# Patient Record
Sex: FEMALE | Race: WHITE | NOT HISPANIC OR LATINO | Employment: OTHER | URBAN - METROPOLITAN AREA
[De-identification: names, ages, dates, MRNs, and addresses within clinical notes are randomized per-mention and may not be internally consistent; named-entity substitution may affect disease eponyms.]

---

## 2017-02-08 DIAGNOSIS — D72.819 DECREASED WHITE BLOOD CELL COUNT: ICD-10-CM

## 2017-02-08 DIAGNOSIS — E78.5 HYPERLIPIDEMIA: ICD-10-CM

## 2017-02-08 DIAGNOSIS — R94.5 ABNORMAL RESULTS OF LIVER FUNCTION STUDIES: ICD-10-CM

## 2017-04-05 ENCOUNTER — GENERIC CONVERSION - ENCOUNTER (OUTPATIENT)
Dept: OTHER | Facility: OTHER | Age: 57
End: 2017-04-05

## 2017-04-07 ENCOUNTER — GENERIC CONVERSION - ENCOUNTER (OUTPATIENT)
Dept: OTHER | Facility: OTHER | Age: 57
End: 2017-04-07

## 2017-04-12 ENCOUNTER — ALLSCRIPTS OFFICE VISIT (OUTPATIENT)
Dept: OTHER | Facility: OTHER | Age: 57
End: 2017-04-12

## 2017-04-12 DIAGNOSIS — E78.5 HYPERLIPIDEMIA: ICD-10-CM

## 2017-04-12 DIAGNOSIS — B18.2 CHRONIC VIRAL HEPATITIS C (HCC): ICD-10-CM

## 2017-04-18 ENCOUNTER — GENERIC CONVERSION - ENCOUNTER (OUTPATIENT)
Dept: OTHER | Facility: OTHER | Age: 57
End: 2017-04-18

## 2017-04-21 ENCOUNTER — GENERIC CONVERSION - ENCOUNTER (OUTPATIENT)
Dept: OTHER | Facility: OTHER | Age: 57
End: 2017-04-21

## 2017-06-05 ENCOUNTER — GENERIC CONVERSION - ENCOUNTER (OUTPATIENT)
Dept: OTHER | Facility: OTHER | Age: 57
End: 2017-06-05

## 2017-07-18 ENCOUNTER — GENERIC CONVERSION - ENCOUNTER (OUTPATIENT)
Dept: OTHER | Facility: OTHER | Age: 57
End: 2017-07-18

## 2017-08-14 DIAGNOSIS — R79.89 OTHER SPECIFIED ABNORMAL FINDINGS OF BLOOD CHEMISTRY: ICD-10-CM

## 2017-10-19 ENCOUNTER — GENERIC CONVERSION - ENCOUNTER (OUTPATIENT)
Dept: OTHER | Facility: OTHER | Age: 57
End: 2017-10-19

## 2017-11-17 ENCOUNTER — GENERIC CONVERSION - ENCOUNTER (OUTPATIENT)
Dept: OTHER | Facility: OTHER | Age: 57
End: 2017-11-17

## 2017-11-17 ENCOUNTER — ALLSCRIPTS OFFICE VISIT (OUTPATIENT)
Dept: OTHER | Facility: OTHER | Age: 57
End: 2017-11-17

## 2017-11-18 NOTE — PROGRESS NOTES
Assessment    1  Nasal congestion (478 19) (R09 81)   2  Anxiety (300 00) (F41 9)    Plan  Anxiety    · FLUoxetine HCl - 20 MG Oral Capsule; TAKE 4 CAPSULE Daily  Nasal congestion    · Fluticasone Propionate 50 MCG/ACT Nasal Suspension; USE 2 SPRAYS IN EACHNOSTRIL ONCE DAILY    Discussion/Summary    Restart FlonaseProzac  Possible side effects of new medications were reviewed with the patient/guardian today  The treatment plan was reviewed with the patient/guardian  The patient/guardian understands and agrees with the treatment plan      Chief Complaint  Nasal congestion      History of Present Illness  HPI: She reports congestion, states she used Flonase in the past but this   Would like a refill  She has been congested coughing for 2 weeks  No fever  also needs refill on Prozac  Doing well on this  Anxiety is controlled      Review of Systems   Constitutional: no fever-- and-- no chills  ENT: as noted in HPI  Respiratory: as noted in HPI  Active Problems  1  Anxiety (300 00) (F41 9)   2  Arthritis (716 90) (M19 90)   3  Cervicalgia (723 1) (M54 2)   4  Chronic fatigue syndrome (780 71) (R53 82)   5  Chronic insomnia (780 52) (F51 04)   6  Complaints of memory disturbance (780 93) (R41 3)   7  Cyst of neck (706 2) (L72 3)   8  Elevated LFTs (790 6) (R79 89)   9  Esophageal reflux (530 81) (K21 9)   10  Fibromyalgia (729 1) (M79 7)   11  Glaucoma (365 9) (H40 9)   12  Hepatitis C, chronic (070 54) (B18 2)   13  Hyperlipidemia (272 4) (E78 5)   14  Leukopenia (288 50) (D72 819)   15  Osteoporosis (733 00) (M81 0)   16  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 1)    Past Medical History  1  History of Abdominal pain (789 00) (R10 9)   2  Acute bronchitis (466 0) (J20 9)   3  Acute sinusitis (461 9) (J01 90)   4  Acute upper respiratory infection (465 9) (J06 9)   5  Anxiety (300 00) (F41 9)   6  History of Arthritis (V13 4)   7  Chronic fatigue syndrome (780 71) (R53 82)   8   History of Depression (311) (F32 9)   9  Esophageal reflux (530 81) (K21 9)   10  History of Fibromyalgia (729 1) (M79 7)   11  History of Flank pain (789 09) (R10 9)   12  History of Gallbladder disease (575 9) (K82 9)   13  Hepatitis C, chronic (070 54) (B18 2)   14  History of angular cheilitis (V12 79) (Z87 19)   15  History of chest pain (V13 89) (Z87 898)   16  History of glaucoma (V12 49) (Z86 69)   17  History of hepatitis (V12 09) (Z86 19)   18  History of insomnia (V13 89) (Z87 898)   19  History of pharyngitis (V12 69) (Z87 09)   20  Hyperlipidemia (272 4) (E78 5)   21  History of Sinusitis (473 9) (J32 9)   22  History of Sorethroat (462) (J02 9)  Active Problems And Past Medical History Reviewed: The active problems and past medical history were reviewed and updated today  Family History  Mother    1  Family history of Arthritis (V17 7)   2  Family history of Coronary Artery Disease (V17 49)   3  Family history of mental disorder (V17 0) (Z81 8)   4  Denied: Family history of substance abuse   5  Family history of Heart Disease (V17 49)   6  Family history of Hypertension (V17 49)  Father    9  Family history of mental disorder (V17 0) (Z81 8)   8  Denied: Family history of substance abuse  Family History    9  Family history of Coronary Artery Disease (V17 49)    Social History   · Active smoker (305 1) (Z72 0)   · Alcohol   · Caffeine Use  The social history was reviewed and updated today  Surgical History    1  History of Appendectomy   2  History of Ear Surgery   3  History of Gastric Surgery For Morbid Obesity Gastric Bypass   4  History of Neuroplasty Decompression Median Nerve At Carpal Tunnel   5  History of Rotator Cuff Repair  Surgical History Reviewed: The surgical history was reviewed and updated today  Current Meds   1  Atorvastatin Calcium 10 MG Oral Tablet; TAKE 1 TABLET DAILY AS DIRECTED; Therapy: [id number] to (Evaluate:03Jun2018)  Requested for: 62AEH9686; Last Rx:08Jun2017 Ordered   2  FLUoxetine HCl - 20 MG Oral Capsule; TAKE 4 CAPSULE Daily; Therapy: [id number] to (Uma Tomas)  Requested for: 40Vxo4883; Last Rx:[id number] Ordered   3  Lumigan 0 01 % Ophthalmic Solution Recorded   4  TraZODone HCl - 150 MG Oral Tablet; TAKE 1 TABLET AT BEDTIME  Requested for: [id number]; Last Rx:[id number] Ordered    The medication list was reviewed and updated today  Allergies    1  Morphine Derivatives    Physical Exam   Constitutional  General appearance: No acute distress, well appearing and well nourished  Ears, Nose, Mouth, and Throat  Otoscopic examination: Tympanic membranes translucent with normal light reflex  Canals patent without erythema  Nasal mucosa, septum, and turbinates: Abnormal   There was clear rhinorrhea from both nares  The bilateral nasal mucosa was edematous  Oropharynx: Normal with no erythema, edema, exudate or lesions  Pulmonary  Respiratory effort: No increased work of breathing or signs of respiratory distress  Auscultation of lungs: Clear to auscultation  Cardiovascular  Auscultation of heart: Normal rate and rhythm, normal S1 and S2, without murmurs  Psychiatric  Orientation to person, place, and time: Normal    Mood and affect: Normal          Results/Data  PHQ-9 Adult Depression Screening [id number] 12:25PM User, [facility]     Test Name Result Flag Reference   PHQ-9 Adult Depression Score 3       Over the last two weeks, how often have you been bothered by any of the following problems?  Little interest or pleasure in doing things: Not at all - 0 Feeling down, depressed, or hopeless: Not at all - 0 Trouble falling or staying asleep, or sleeping too much: Several days - 1 Feeling tired or having little energy: Several days - 1 Poor appetite or over eating: Not at all - 0 Feeling bad about yourself - or that you are a failure or have let yourself or your family down: Not at all - 0 Trouble concentrating on things, such as reading the newspaper or watching television: Several days - 1 Moving or speaking so slowly that other people could have noticed   Or the opposite -  being so fidgety or restless that you have been moving around a lot more than usual: Not at all - 0 Thoughts that you would be better off dead, or of hurting yourself in some way: Not at all - 0   PHQ-9 Adult Depression Screening Negative     PHQ-9 Difficulty Level Somewhat difficult     PHQ-9 Severity Minimal Depression           Signatures   Electronically signed by : Palma Chambers MD; Nov 17 2017 12:31PM EST                       (Author)

## 2017-11-24 ENCOUNTER — GENERIC CONVERSION - ENCOUNTER (OUTPATIENT)
Dept: OTHER | Facility: OTHER | Age: 57
End: 2017-11-24

## 2018-01-13 VITALS
OXYGEN SATURATION: 95 % | BODY MASS INDEX: 23.56 KG/M2 | DIASTOLIC BLOOD PRESSURE: 82 MMHG | SYSTOLIC BLOOD PRESSURE: 108 MMHG | TEMPERATURE: 97.7 F | HEIGHT: 64 IN | WEIGHT: 138 LBS | HEART RATE: 76 BPM

## 2018-01-16 NOTE — MISCELLANEOUS
To Whom It May Concern:    Marielos Remy is a patient under my care  She has a long standing history of depression  Her dog, Harlan Connor, is a certified emotional support dog and should be permitted  to travel with Ameya Chatterjee for emotional support  If you have any questions or concerns, please contact my office       Sincerely,    Esperanza Guillermo MD     Electronically signed by:Elaine Goff   Nov 24 2017  9:52AM EST

## 2018-01-18 NOTE — MISCELLANEOUS
To Whom It May Concern:    Yoon Baig is one of my patients  She is on four Fluoxetine 20 mg capsules per day  She is on this medication for her significant anxiety, and she has been stable on this dose  She needs the  capsules due to gastrointestinal issues related to previous gastric bypass surgery  She cannot take the 40 mg tablets due to their size  She has tried other SSRIs with no success  I request approval for the four 20 mg capsules per day for these reasons  If you have any questions or concerns please contact my office      Sincerely,    Keerthi Walker MD            Electronically signed by:Roxane Morales MD  Nov 30 2016  9:28AM EST

## 2018-01-22 VITALS
SYSTOLIC BLOOD PRESSURE: 112 MMHG | WEIGHT: 131.25 LBS | DIASTOLIC BLOOD PRESSURE: 84 MMHG | HEART RATE: 68 BPM | TEMPERATURE: 98.2 F | OXYGEN SATURATION: 97 % | BODY MASS INDEX: 22.41 KG/M2 | HEIGHT: 64 IN

## 2018-02-20 ENCOUNTER — TELEPHONE (OUTPATIENT)
Dept: FAMILY MEDICINE CLINIC | Facility: CLINIC | Age: 58
End: 2018-02-20

## 2018-02-20 DIAGNOSIS — J32.9 SINUSITIS, UNSPECIFIED CHRONICITY, UNSPECIFIED LOCATION: Primary | ICD-10-CM

## 2018-02-20 DIAGNOSIS — B37.3 VAGINAL CANDIDIASIS: ICD-10-CM

## 2018-02-20 RX ORDER — AZITHROMYCIN 250 MG/1
TABLET, FILM COATED ORAL
Qty: 6 TABLET | Refills: 0 | Status: SHIPPED | OUTPATIENT
Start: 2018-02-20 | End: 2018-02-24

## 2018-02-20 RX ORDER — FLUCONAZOLE 150 MG/1
150 TABLET ORAL ONCE
Qty: 1 TABLET | Refills: 0 | Status: SHIPPED | OUTPATIENT
Start: 2018-02-20 | End: 2018-02-20

## 2018-02-20 NOTE — TELEPHONE ENCOUNTER
Pt in [place] the patient has sinus infection,pnd,sore throat ,chest congestion   Can rx zirhromax & diflucan be phoned into [facility] [address] [phone] call pt w/ans [phone]

## 2018-03-06 ENCOUNTER — TELEPHONE (OUTPATIENT)
Dept: FAMILY MEDICINE CLINIC | Facility: CLINIC | Age: 58
End: 2018-03-06

## 2018-03-06 DIAGNOSIS — F41.9 ANXIETY: Primary | ICD-10-CM

## 2018-03-06 RX ORDER — FLUOXETINE HYDROCHLORIDE 20 MG/1
80 CAPSULE ORAL DAILY
Qty: 120 CAPSULE | Refills: 0 | Status: SHIPPED | OUTPATIENT
Start: 2018-03-06 | End: 2018-03-23 | Stop reason: SDUPTHER

## 2018-03-06 RX ORDER — FLUOXETINE HYDROCHLORIDE 20 MG/1
4 CAPSULE ORAL DAILY
COMMUNITY
Start: 2011-11-25 | End: 2018-03-06 | Stop reason: SDUPTHER

## 2018-03-06 NOTE — TELEPHONE ENCOUNTER
PT WAS APPROVED FOR HER PROZAC - she needs to take more of the lower dose capsules due to her gastric bypass  please order qty 120   Taking  4 - 20mg capsules (80mg total) once daily to [facility]  [address]  [phone]

## 2018-03-23 DIAGNOSIS — F41.9 ANXIETY: ICD-10-CM

## 2018-03-23 DIAGNOSIS — Z13.220 LIPID SCREENING: Primary | ICD-10-CM

## 2018-03-23 RX ORDER — FLUOXETINE HYDROCHLORIDE 20 MG/1
80 CAPSULE ORAL DAILY
Qty: 360 CAPSULE | Refills: 0 | Status: SHIPPED | OUTPATIENT
Start: 2018-03-23 | End: 2018-04-25 | Stop reason: SDUPTHER

## 2018-03-23 NOTE — TELEPHONE ENCOUNTER
Has an appointment for 4/25 would like us to mail a lab slip so she can get labs done prior to visit  Can you please order?     Girls mail to patient please

## 2018-04-02 LAB
ALBUMIN SERPL BCP-MCNC: 4.2 G/DL (ref 3.5–5.7)
ALP SERPL-CCNC: 59 IU/L (ref 40–150)
ALT SERPL W P-5'-P-CCNC: 19 IU/L (ref 0–50)
ANION GAP SERPL CALCULATED.3IONS-SCNC: 12 MM/L
AST SERPL W P-5'-P-CCNC: 28 U/L (ref 7–26)
BASOPHILS # BLD AUTO: 0.1 X3/UL (ref 0–0.3)
BASOPHILS # BLD AUTO: 0.9 % (ref 0–2)
BILIRUB SERPL-MCNC: 0.2 MG/DL (ref 0.3–1)
BUN SERPL-MCNC: 12 MG/DL (ref 7–25)
CALCIUM SERPL-MCNC: 9.2 MG/DL (ref 8.6–10.5)
CHLORIDE SERPL-SCNC: 103 MM/L (ref 98–107)
CO2 SERPL-SCNC: 27 MM/L (ref 21–31)
CREAT SERPL-MCNC: 0.67 MG/DL (ref 0.6–1.2)
DEPRECATED RDW RBC AUTO: 13.4 % (ref 11.5–14.5)
EGFR (HISTORICAL): > 60 GFR
EGFR AFRICAN AMERICAN (HISTORICAL): > 60 GFR
EOSINOPHIL # BLD AUTO: 0.4 X3/UL (ref 0–0.5)
EOSINOPHIL NFR BLD AUTO: 5 % (ref 0–5)
GLUCOSE (HISTORICAL): 120 MG/DL (ref 65–99)
HCT VFR BLD AUTO: 39.2 % (ref 37–47)
HGB BLD-MCNC: 13.1 G/DL (ref 12–16)
LYMPHOCYTES # BLD AUTO: 1.5 X3/UL (ref 1.2–4.2)
LYMPHOCYTES NFR BLD AUTO: 19.9 % (ref 20.5–51.1)
MCH RBC QN AUTO: 31.5 PG (ref 26–34)
MCHC RBC AUTO-ENTMCNC: 33.4 G/DL (ref 31–36)
MCV RBC AUTO: 94.2 FL (ref 81–99)
MONOCYTES # BLD AUTO: 0.5 X3/UL (ref 0–1)
MONOCYTES NFR BLD AUTO: 6.3 % (ref 1.7–12)
NEUTROPHILS # BLD AUTO: 5.1 X3/UL (ref 1.4–6.5)
NEUTS SEG NFR BLD AUTO: 67.9 % (ref 42.2–75.2)
OSMOLALITY, SERUM (HISTORICAL): 277 MOSM (ref 262–291)
PLATELET # BLD AUTO: 205 X3/UL (ref 130–400)
PMV BLD AUTO: 9.5 FL (ref 8.6–11.7)
POTASSIUM SERPL-SCNC: 4 MM/L (ref 3.5–5.5)
RBC # BLD AUTO: 4.17 X6/UL (ref 3.9–5.2)
SODIUM SERPL-SCNC: 138 MM/L (ref 134–143)
TOTAL PROTEIN (HISTORICAL): 7 G/DL (ref 6.4–8.9)
WBC # BLD AUTO: 7.6 X3/UL (ref 4.8–10.8)

## 2018-04-09 LAB
HCV PCR QUANTITATIVE (HISTORICAL): NORMAL IU/ML
TEST INFORMATION (HISTORICAL): NORMAL

## 2018-04-11 LAB
ALBUMIN SERPL BCP-MCNC: 4.4 G/DL (ref 3.5–5.7)
ALP SERPL-CCNC: 52 IU/L (ref 40–150)
ALT SERPL W P-5'-P-CCNC: 24 IU/L (ref 0–50)
ANION GAP SERPL CALCULATED.3IONS-SCNC: 12.5 MM/L
AST SERPL W P-5'-P-CCNC: 34 U/L (ref 7–26)
BASOPHILS # BLD AUTO: 0.1 X3/UL (ref 0–0.3)
BASOPHILS # BLD AUTO: 2 % (ref 0–2)
BILIRUB SERPL-MCNC: 0.4 MG/DL (ref 0.3–1)
BUN SERPL-MCNC: 9 MG/DL (ref 7–25)
CALCIUM SERPL-MCNC: 9.9 MG/DL (ref 8.6–10.5)
CHLORIDE SERPL-SCNC: 101 MM/L (ref 98–107)
CHOLEST SERPL-MCNC: 210 MG/DL (ref 0–200)
CO2 SERPL-SCNC: 29 MM/L (ref 21–31)
CREAT SERPL-MCNC: 0.7 MG/DL (ref 0.6–1.2)
DEPRECATED RDW RBC AUTO: 13.5 % (ref 11.5–14.5)
EGFR (HISTORICAL): > 60 GFR
EGFR AFRICAN AMERICAN (HISTORICAL): > 60 GFR
EOSINOPHIL # BLD AUTO: 0.4 X3/UL (ref 0–0.5)
EOSINOPHIL NFR BLD AUTO: 8.7 % (ref 0–5)
GLUCOSE (HISTORICAL): 95 MG/DL (ref 65–99)
HCT VFR BLD AUTO: 43.5 % (ref 37–47)
HDLC SERPL-MCNC: 61 MG/DL (ref 40–60)
HGB BLD-MCNC: 14.3 G/DL (ref 12–16)
LDLC SERPL CALC-MCNC: 132.5 MG/DL (ref 75–193)
LYMPHOCYTES # BLD AUTO: 1.7 X3/UL (ref 1.2–4.2)
LYMPHOCYTES NFR BLD AUTO: 33.4 % (ref 20.5–51.1)
MCH RBC QN AUTO: 30.7 PG (ref 26–34)
MCHC RBC AUTO-ENTMCNC: 32.9 G/DL (ref 31–36)
MCV RBC AUTO: 93.3 FL (ref 81–99)
MONOCYTES # BLD AUTO: 0.5 X3/UL (ref 0–1)
MONOCYTES NFR BLD AUTO: 8.8 % (ref 1.7–12)
NEUTROPHILS # BLD AUTO: 2.4 X3/UL (ref 1.4–6.5)
NEUTS SEG NFR BLD AUTO: 47.1 % (ref 42.2–75.2)
OSMOLALITY, SERUM (HISTORICAL): 274 MOSM (ref 262–291)
PLATELET # BLD AUTO: 202 X3/UL (ref 130–400)
PMV BLD AUTO: 10.3 FL (ref 8.6–11.7)
POTASSIUM SERPL-SCNC: 4.5 MM/L (ref 3.5–5.5)
RBC # BLD AUTO: 4.66 X6/UL (ref 3.9–5.2)
SODIUM SERPL-SCNC: 138 MM/L (ref 134–143)
TOTAL PROTEIN (HISTORICAL): 7.3 G/DL (ref 6.4–8.9)
TRIGL SERPL-MCNC: 85 MG/DL (ref 44–166)
VLDL CHOLESTEROL (HISTORICAL): 17 MG/DL (ref 5–51)
WBC # BLD AUTO: 5.1 X3/UL (ref 4.8–10.8)

## 2018-04-25 ENCOUNTER — OFFICE VISIT (OUTPATIENT)
Dept: FAMILY MEDICINE CLINIC | Facility: CLINIC | Age: 58
End: 2018-04-25
Payer: COMMERCIAL

## 2018-04-25 VITALS
WEIGHT: 128 LBS | TEMPERATURE: 98.2 F | HEART RATE: 72 BPM | OXYGEN SATURATION: 97 % | SYSTOLIC BLOOD PRESSURE: 118 MMHG | HEIGHT: 64 IN | BODY MASS INDEX: 21.85 KG/M2 | DIASTOLIC BLOOD PRESSURE: 84 MMHG

## 2018-04-25 DIAGNOSIS — Z91.09 ENVIRONMENTAL ALLERGIES: ICD-10-CM

## 2018-04-25 DIAGNOSIS — M81.0 OSTEOPOROSIS, UNSPECIFIED OSTEOPOROSIS TYPE, UNSPECIFIED PATHOLOGICAL FRACTURE PRESENCE: Primary | ICD-10-CM

## 2018-04-25 DIAGNOSIS — F51.04 CHRONIC INSOMNIA: ICD-10-CM

## 2018-04-25 DIAGNOSIS — F41.9 ANXIETY: ICD-10-CM

## 2018-04-25 DIAGNOSIS — E78.5 HYPERLIPIDEMIA, UNSPECIFIED HYPERLIPIDEMIA TYPE: ICD-10-CM

## 2018-04-25 DIAGNOSIS — B18.2 CHRONIC HEPATITIS C WITHOUT HEPATIC COMA (HCC): ICD-10-CM

## 2018-04-25 PROCEDURE — 96372 THER/PROPH/DIAG INJ SC/IM: CPT | Performed by: FAMILY MEDICINE

## 2018-04-25 PROCEDURE — 99214 OFFICE O/P EST MOD 30 MIN: CPT | Performed by: FAMILY MEDICINE

## 2018-04-25 RX ORDER — BIMATOPROST 0.01 %
1 DROPS OPHTHALMIC (EYE) DAILY
COMMUNITY
Start: 2018-04-03 | End: 2019-04-18

## 2018-04-25 RX ORDER — TRAZODONE HYDROCHLORIDE 150 MG/1
150 TABLET ORAL
COMMUNITY
Start: 2017-10-23 | End: 2018-04-25 | Stop reason: SDUPTHER

## 2018-04-25 RX ORDER — TRAZODONE HYDROCHLORIDE 150 MG/1
150 TABLET ORAL
Qty: 90 TABLET | Refills: 3 | Status: SHIPPED | OUTPATIENT
Start: 2018-04-25 | End: 2019-04-18 | Stop reason: SDUPTHER

## 2018-04-25 RX ORDER — ATORVASTATIN CALCIUM 10 MG/1
1 TABLET, FILM COATED ORAL DAILY
COMMUNITY
Start: 2014-02-25 | End: 2018-09-25 | Stop reason: SDUPTHER

## 2018-04-25 RX ORDER — FLUOXETINE HYDROCHLORIDE 20 MG/1
80 CAPSULE ORAL DAILY
Qty: 360 CAPSULE | Refills: 3 | Status: SHIPPED | OUTPATIENT
Start: 2018-04-25 | End: 2018-04-25 | Stop reason: SDUPTHER

## 2018-04-25 RX ORDER — FLUTICASONE PROPIONATE 50 MCG
2 SPRAY, SUSPENSION (ML) NASAL DAILY
COMMUNITY
Start: 2017-11-17 | End: 2019-04-18 | Stop reason: SDUPTHER

## 2018-04-25 RX ORDER — OXYCODONE HYDROCHLORIDE 5 MG/1
1 TABLET ORAL 2 TIMES DAILY
COMMUNITY
Start: 2018-04-17 | End: 2021-08-18 | Stop reason: HOSPADM

## 2018-04-25 RX ORDER — FLUOXETINE HYDROCHLORIDE 20 MG/1
80 CAPSULE ORAL DAILY
Qty: 360 CAPSULE | Refills: 3 | Status: SHIPPED | OUTPATIENT
Start: 2018-04-25 | End: 2019-04-18 | Stop reason: SDUPTHER

## 2018-04-25 NOTE — PROGRESS NOTES
Assessment/Plan:     Diagnoses and all orders for this visit:    Osteoporosis, unspecified osteoporosis type, unspecified pathological fracture presence  -     denosumab (PROLIA) subcutaneous injection 60 mg; Inject 1 mL (60 mg total) under the skin once     Hyperlipidemia, unspecified hyperlipidemia type  -     Comprehensive metabolic panel; Future  -     Lipid Panel with Direct LDL reflex; Future    Chronic hepatitis C without hepatic coma (HCC)    Anxiety  -     Discontinue: FLUoxetine (PROzac) 20 mg capsule; Take 4 capsules (80 mg total) by mouth daily  -     traZODone (DESYREL) 150 mg tablet; Take 1 tablet (150 mg total) by mouth daily at bedtime  -     FLUoxetine (PROzac) 20 mg capsule; Take 4 capsules (80 mg total) by mouth daily    Chronic insomnia    Environmental allergies    Other orders  -     atorvastatin (LIPITOR) 10 mg tablet; Take 1 tablet by mouth daily  -     LUMIGAN 0 01 % ophthalmic drops; Administer 1 drop to both eyes daily  -     fluticasone (FLONASE) 50 mcg/act nasal spray; 2 sprays into each nostril daily  -     oxyCODONE (ROXICODONE) 5 mg immediate release tablet; Take 1 tablet by mouth 2 (two) times a day  -     Discontinue: traZODone (DESYREL) 150 mg tablet; Take 150 tablets by mouth daily at bedtime          She will restart her atorvastatin and we will recheck her cholesterol in 3 months  Continue all other medications  Recheck in 3 months   next Prolia will be due in 6 months  Subjective:      Patient ID: Salazar Chao is a 63 y o  female  The patient is here for a routine checkup  She has a history of osteoporosis and is here for a Prolia injection today  She is due for a DEXA in May  She has a history of hyperlipidemia  Recent labs showed that her cholesterol was 210, LDL was 132  She had been off Atorvastatin for about 3 months because she was being treated for Hep C  She states she just completed the treatment at the end of March    Dr Radha Mejia @ [facility] Gastroenterologist      She sees pain management for chronic back pain and is on oxycodone  She is experiencing some postnasal drip which she thinks may be due to allergies  She is using Flonase and over-the-counter allergy medication  Her anxiety and insomnia have been controlled with fluoxetine and trazodone  The following portions of the patient's history were reviewed and updated as appropriate:   She  has a past medical history of Anxiety; Arthritis; Chronic fatigue syndrome; Chronic hepatitis C ([facility] [address] ); Depression; Esophageal reflux; Fibromyalgia; Gallbladder disease; Glaucoma; Hyperlipidemia; and Insomnia  She   Patient Active Problem List    Diagnosis Date Noted    Environmental allergies 04/25/2018    Osteoporosis 09/12/2016    Chronic insomnia 05/19/2015    Recurrent cold sores 11/10/2014    Anxiety 05/12/2014    Neck pain 04/23/2014    Chronic fatigue syndrome 01/27/2014    Esophageal reflux 10/16/2013    Hepatitis C, chronic ([facility] ) 10/16/2013    Hyperlipidemia 07/11/2013    Fibromyalgia 08/27/2012    Glaucoma 08/27/2012    Arthritis 07/05/2012     She  has a past surgical history that includes Appendectomy; EAR SURGERY; Gastric bypass; Neuroplasty / transposition median nerve at carpal tunnel bilateral; and Rotator cuff repair  Her family history includes Arthritis in her mother; Coronary artery disease in her family and mother; Heart disease in her mother; Hypertension in her mother; Mental illness in her father and mother  She  reports that she has been smoking  She has never used smokeless tobacco  She reports that she drinks alcohol  Her drug history is not on file    Current Outpatient Prescriptions   Medication Sig Dispense Refill    atorvastatin (LIPITOR) 10 mg tablet Take 1 tablet by mouth daily      FLUoxetine (PROzac) 20 mg capsule Take 4 capsules (80 mg total) by mouth daily 360 capsule 3    fluticasone (FLONASE) 50 mcg/act nasal spray 2 sprays into each nostril daily      LUMIGAN 0 01 % ophthalmic drops Administer 1 drop to both eyes daily      oxyCODONE (ROXICODONE) 5 mg immediate release tablet Take 1 tablet by mouth 2 (two) times a day      traZODone (DESYREL) 150 mg tablet Take 1 tablet (150 mg total) by mouth daily at bedtime 90 tablet 3     No current facility-administered medications for this visit  Current Outpatient Prescriptions on File Prior to Visit   Medication Sig    [DISCONTINUED] FLUoxetine (PROzac) 20 mg capsule Take 4 capsules (80 mg total) by mouth daily     No current facility-administered medications on file prior to visit  Review of Systems   Constitutional: Negative for activity change, appetite change, chills, fatigue, fever and unexpected weight change  HENT: Positive for postnasal drip and sore throat  Negative for congestion, ear discharge, ear pain and sinus pressure  Eyes: Negative for discharge and visual disturbance  Respiratory: Negative for cough, shortness of breath and wheezing  Cardiovascular: Negative for chest pain, palpitations and leg swelling  Gastrointestinal: Negative for abdominal pain, constipation, diarrhea, nausea and vomiting  Endocrine: Negative for cold intolerance, heat intolerance, polydipsia and polyuria  Genitourinary: Negative for difficulty urinating and frequency  Musculoskeletal: Positive for back pain  Negative for arthralgias, joint swelling and myalgias  Skin: Negative for rash  Neurological: Negative for dizziness, weakness, light-headedness, numbness and headaches  Hematological: Negative for adenopathy  Psychiatric/Behavioral: Positive for sleep disturbance  Negative for behavioral problems, confusion, dysphoric mood and suicidal ideas  The patient is nervous/anxious            Objective:      /84   Pulse 72   Temp 98 2 °F (36 8 °C)   Ht 5' 4" (1 626 m)   Wt 58 1 kg (128 lb)   SpO2 97%   BMI 21 97 kg/m²          Physical Exam   Constitutional: She is oriented to person, place, and time  She appears well-developed and well-nourished  No distress  HENT:   Right Ear: Hearing, tympanic membrane, external ear and ear canal normal    Left Ear: Hearing, tympanic membrane, external ear and ear canal normal    Nose: Nose normal    Mouth/Throat: Uvula is midline and oropharynx is clear and moist  No oropharyngeal exudate (Clear postnasal drip ) or posterior oropharyngeal erythema  Eyes: Conjunctivae are normal  Pupils are equal, round, and reactive to light  Cardiovascular: Normal rate and normal heart sounds  Exam reveals no gallop and no friction rub  No murmur heard  Pulmonary/Chest: Effort normal and breath sounds normal  No respiratory distress  She has no wheezes  She has no rales  Lymphadenopathy:     She has no cervical adenopathy  Neurological: She is alert and oriented to person, place, and time  Skin: Skin is warm  No rash noted  Psychiatric: She has a normal mood and affect  Her behavior is normal  Judgment and thought content normal    Nursing note and vitals reviewed

## 2018-05-11 ENCOUNTER — VBI (OUTPATIENT)
Dept: ADMINISTRATIVE | Facility: OTHER | Age: 58
End: 2018-05-11

## 2018-05-11 NOTE — TELEPHONE ENCOUNTER
Alber Mares    ED Visit Information     Ed visit date:5/2/18  Diagnosis Description: OPEN BITE OF RIGHT MIDDLE FINGER WITHOUT DAMAGE TO NAIL, INITIAL ENCOUNTER  In Network? No  Discharge status: Home  Discharged with meds ? Yes  Number of ED visits to date: 1  ED Severity:3     Outreach Information    Outreach successful: Yes 1  Date letter mailed:  Date Finalized:5/11/18    Care Coordination    Follow up appointment with pcp: no declined   Transportation issues ? No    [employer] type:  7 Day Outreach  [facility] PCP:  Yes  Transportation:  Friend/Family Transport  Called PCP first?:  No  Feels able to call PCP for urgent problems ?:  Yes  Understands what emergencies can be handled by PCP ?:  Yes  Ever any problems getting appointment with PCP for minor emergency/urgency problems?:  No  Practice Contacted Patient ?:  No  Pt had ED follow up with pcp/staff ?:  No    Seen for follow-up out of network ?:  No  Reason Pt went out of  network ?:  proximty, was out of state/area  Urgent care Education?:  No  Spoke with Brian Tirado today she stated she is out of town for work and was recently bit by a dog  Pt states she received a tetanus shot and abx  And is feeling better  Declined f/u with PCP 1 - out of town and finger is better since incident

## 2018-06-21 ENCOUNTER — OFFICE VISIT (OUTPATIENT)
Dept: FAMILY MEDICINE CLINIC | Facility: CLINIC | Age: 58
End: 2018-06-21
Payer: COMMERCIAL

## 2018-06-21 VITALS
HEIGHT: 64 IN | DIASTOLIC BLOOD PRESSURE: 78 MMHG | OXYGEN SATURATION: 98 % | SYSTOLIC BLOOD PRESSURE: 112 MMHG | TEMPERATURE: 99.1 F | WEIGHT: 131.2 LBS | BODY MASS INDEX: 22.4 KG/M2 | HEART RATE: 76 BPM
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
## 2018-06-21 DIAGNOSIS — B37.3 CANDIDA VAGINITIS: ICD-10-CM

## 2018-06-21 DIAGNOSIS — S20.461A INFECTED INSECT BITE OF BACK, RIGHT, INITIAL ENCOUNTER: Primary | ICD-10-CM

## 2018-06-21 DIAGNOSIS — L08.9 INFECTED INSECT BITE OF BACK, RIGHT, INITIAL ENCOUNTER: Primary | ICD-10-CM

## 2018-06-21 PROBLEM — S20.469A: Status: ACTIVE | Noted: 2018-06-21

## 2018-06-21 PROCEDURE — 99213 OFFICE O/P EST LOW 20 MIN: CPT | Performed by: FAMILY MEDICINE

## 2018-06-21 RX ORDER — SULFAMETHOXAZOLE AND TRIMETHOPRIM 800; 160 MG/1; MG/1
1 TABLET ORAL EVERY 12 HOURS SCHEDULED
Qty: 14 TABLET | Refills: 0 | Status: SHIPPED | OUTPATIENT
Start: 2018-06-21 | End: 2018-06-28

## 2018-06-21 RX ORDER — FLUCONAZOLE 150 MG/1
150 TABLET ORAL ONCE
Qty: 1 TABLET | Refills: 0 | Status: SHIPPED | OUTPATIENT
Start: 2018-06-21 | End: 2018-06-21

## 2018-06-21 NOTE — PROGRESS NOTES
Assessment/Plan:       Diagnoses and all orders for this visit:    Infected insect bite of back, right, initial encounter  -     sulfamethoxazole-trimethoprim (BACTRIM DS) 800-160 mg per tablet; Take 1 tablet by mouth every 12 (twelve) hours for 7 days  -     mupirocin (BACTROBAN) 2 % ointment; Apply topically 3 (three) times a day    Candida vaginitis  -     fluconazole (DIFLUCAN) 150 mg tablet; Take 1 tablet (150 mg total) by mouth once for 1 dose        Take antibiotics as directed used Jose topically  She requested fluconazole for when she complete the antibiotic, for yeast infection  I advised her call or come back in and 1 week if this is not improved  Go to the ER if it gets worse       Subjective:      Patient ID: Wes Sharma is a 63 y o  female  She is here for a spider bite on her back  She states yesterday she noticed a bite which look typical bite to her on her right upper back  However over the last few days it has been very itchy and swollen and she noticed that there was increasing redness and pain in the area  She has been trying Neosporin topically with no relief  The following portions of the patient's history were reviewed and updated as appropriate: She  has a past medical history of Anxiety; Arthritis; Chronic fatigue syndrome; Chronic hepatitis C ([facility] [address] ); Depression; Esophageal reflux; Fibromyalgia; Gallbladder disease; Glaucoma; Hyperlipidemia; and Insomnia    She   Patient Active Problem List    Diagnosis Date Noted    Insect bite of back, infected 06/21/2018    Environmental allergies 04/25/2018    Osteoporosis 09/12/2016    Chronic insomnia 05/19/2015    Recurrent cold sores 11/10/2014    Anxiety 05/12/2014    Neck pain 04/23/2014    Chronic fatigue syndrome 01/27/2014    Esophageal reflux 10/16/2013    Hepatitis C, chronic ([facility] [address] ) 10/16/2013    Hyperlipidemia 07/11/2013    Fibromyalgia 08/27/2012    Glaucoma 08/27/2012    Arthritis 07/05/2012     She  has a past surgical history that includes Appendectomy; EAR SURGERY; Gastric bypass; Neuroplasty / transposition median nerve at carpal tunnel bilateral; and Rotator cuff repair  Her family history includes Arthritis in her mother; Coronary artery disease in her family and mother; Heart disease in her mother; Hypertension in her mother; Mental illness in her father and mother  She  reports that she has been smoking  She has never used smokeless tobacco  She reports that she drinks alcohol  Her drug history is not on file  Current Outpatient Prescriptions   Medication Sig Dispense Refill    atorvastatin (LIPITOR) 10 mg tablet Take 1 tablet by mouth daily      FLUoxetine (PROzac) 20 mg capsule Take 4 capsules (80 mg total) by mouth daily 360 capsule 3    fluticasone (FLONASE) 50 mcg/act nasal spray 2 sprays into each nostril daily      LUMIGAN 0 01 % ophthalmic drops Administer 1 drop to both eyes daily      oxyCODONE (ROXICODONE) 5 mg immediate release tablet Take 1 tablet by mouth 2 (two) times a day      traZODone (DESYREL) 150 mg tablet Take 1 tablet (150 mg total) by mouth daily at bedtime 90 tablet 3    fluconazole (DIFLUCAN) 150 mg tablet Take 1 tablet (150 mg total) by mouth once for 1 dose 1 tablet 0    mupirocin (BACTROBAN) 2 % ointment Apply topically 3 (three) times a day 22 g 0    sulfamethoxazole-trimethoprim (BACTRIM DS) 800-160 mg per tablet Take 1 tablet by mouth every 12 (twelve) hours for 7 days 14 tablet 0     No current facility-administered medications for this visit        Current Outpatient Prescriptions on File Prior to Visit   Medication Sig    atorvastatin (LIPITOR) 10 mg tablet Take 1 tablet by mouth daily    FLUoxetine (PROzac) 20 mg capsule Take 4 capsules (80 mg total) by mouth daily    fluticasone (FLONASE) 50 mcg/act nasal spray 2 sprays into each nostril daily    LUMIGAN 0 01 % ophthalmic drops Administer 1 drop to both eyes daily    oxyCODONE (ROXICODONE) 5 mg immediate release tablet Take 1 tablet by mouth 2 (two) times a day    traZODone (DESYREL) 150 mg tablet Take 1 tablet (150 mg total) by mouth daily at bedtime     No current facility-administered medications on file prior to visit  She is allergic to morphine and morphine and related       Review of Systems   Constitutional: Negative for fever  Skin: Positive for rash and wound  Objective:      /78 (Cuff Size: Standard)   Pulse 76   Temp 99 1 °F (37 3 °C) (Tympanic)   Ht 5' 4" (1 626 m)   Wt 59 5 kg (131 lb 3 2 oz)   SpO2 98%   BMI 22 52 kg/m²          Physical Exam   Constitutional: She appears well-developed and well-nourished  No distress  Skin:        Nursing note and vitals reviewed

## 2018-06-23 ENCOUNTER — APPOINTMENT (EMERGENCY)
Dept: ULTRASOUND IMAGING | Facility: HOSPITAL | Age: 58
End: 2018-06-23
Payer: COMMERCIAL

## 2018-06-23 ENCOUNTER — HOSPITAL ENCOUNTER (OUTPATIENT)
Facility: HOSPITAL | Age: 58
Setting detail: OBSERVATION
Discharge: LEFT BEFORE DISCHARGE | End: 2018-06-23
Attending: FAMILY MEDICINE | Admitting: INTERNAL MEDICINE
Payer: COMMERCIAL

## 2018-06-23 ENCOUNTER — OFFICE VISIT (OUTPATIENT)
Dept: URGENT CARE | Facility: CLINIC | Age: 58
End: 2018-06-23
Payer: COMMERCIAL

## 2018-06-23 VITALS
WEIGHT: 130 LBS | TEMPERATURE: 101 F | DIASTOLIC BLOOD PRESSURE: 72 MMHG | SYSTOLIC BLOOD PRESSURE: 102 MMHG | BODY MASS INDEX: 22.2 KG/M2 | OXYGEN SATURATION: 96 % | HEIGHT: 64 IN | HEART RATE: 69 BPM | RESPIRATION RATE: 20 BRPM

## 2018-06-23 VITALS
SYSTOLIC BLOOD PRESSURE: 100 MMHG | BODY MASS INDEX: 22.2 KG/M2 | HEART RATE: 69 BPM | HEIGHT: 64 IN | TEMPERATURE: 99.1 F | RESPIRATION RATE: 18 BRPM | OXYGEN SATURATION: 95 % | DIASTOLIC BLOOD PRESSURE: 60 MMHG | WEIGHT: 130 LBS

## 2018-06-23 DIAGNOSIS — L08.9 INFECTED INSECT BITE OF BACK, RIGHT, INITIAL ENCOUNTER: ICD-10-CM
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
## 2018-06-23 DIAGNOSIS — L02.91 ABSCESS: ICD-10-CM

## 2018-06-23 DIAGNOSIS — L03.312 CELLULITIS OF BACK: Primary | ICD-10-CM

## 2018-06-23 DIAGNOSIS — S20.461A INFECTED INSECT BITE OF BACK, RIGHT, INITIAL ENCOUNTER: ICD-10-CM

## 2018-06-23 DIAGNOSIS — T63.301A SPIDER BITE ALLERGY, CURRENT REACTION, ACCIDENTAL OR UNINTENTIONAL, INITIAL ENCOUNTER: Primary | ICD-10-CM

## 2018-06-23 PROBLEM — Z72.0 TOBACCO ABUSE: Chronic | Status: ACTIVE | Noted: 2018-06-23

## 2018-06-23 PROBLEM — A41.9 SEPSIS (HCC): Status: ACTIVE | Noted: 2018-06-23

## 2018-06-23 LAB
ANION GAP SERPL CALCULATED.3IONS-SCNC: 7 MMOL/L (ref 4–13)
BASOPHILS # BLD AUTO: 0 THOUSANDS/ΜL (ref 0–0.1)
BASOPHILS NFR BLD AUTO: 1 % (ref 0–2)
BUN SERPL-MCNC: 5 MG/DL (ref 7–25)
CALCIUM SERPL-MCNC: 9 MG/DL (ref 8.6–10.5)
CHLORIDE SERPL-SCNC: 93 MMOL/L (ref 98–107)
CO2 SERPL-SCNC: 25 MMOL/L (ref 21–31)
CREAT SERPL-MCNC: 0.7 MG/DL (ref 0.6–1.2)
EOSINOPHIL # BLD AUTO: 0 THOUSAND/ΜL (ref 0–0.61)
EOSINOPHIL NFR BLD AUTO: 0 % (ref 0–5)
ERYTHROCYTE [DISTWIDTH] IN BLOOD BY AUTOMATED COUNT: 13.6 % (ref 11.5–14.5)
GFR SERPL CREATININE-BSD FRML MDRD: 96 ML/MIN/1.73SQ M
GLUCOSE SERPL-MCNC: 109 MG/DL (ref 65–99)
HCT VFR BLD AUTO: 37.2 % (ref 34.8–46.1)
HGB BLD-MCNC: 12.7 G/DL (ref 12–16)
LACTATE SERPL-SCNC: 0.6 MMOL/L (ref 0.5–2)
LYMPHOCYTES # BLD AUTO: 0.6 THOUSANDS/ΜL (ref 0.6–4.47)
LYMPHOCYTES NFR BLD AUTO: 9 % (ref 21–51)
MCH RBC QN AUTO: 30.7 PG (ref 26–34)
MCHC RBC AUTO-ENTMCNC: 34 G/DL (ref 31–37)
MCV RBC AUTO: 90 FL (ref 81–99)
MONOCYTES # BLD AUTO: 0.8 THOUSAND/ΜL (ref 0.17–1.22)
MONOCYTES NFR BLD AUTO: 11 % (ref 2–12)
NEUTROPHILS # BLD AUTO: 5.5 THOUSANDS/ΜL (ref 1.4–6.5)
NEUTS SEG NFR BLD AUTO: 79 % (ref 42–75)
NRBC BLD AUTO-RTO: 0 /100 WBCS
PLATELET # BLD AUTO: 153 THOUSANDS/UL (ref 149–390)
PMV BLD AUTO: 9.5 FL (ref 8.6–11.7)
POTASSIUM SERPL-SCNC: 3.8 MMOL/L (ref 3.5–5.5)
RBC # BLD AUTO: 4.13 MILLION/UL (ref 3.9–5.2)
SODIUM SERPL-SCNC: 125 MMOL/L (ref 134–143)
WBC # BLD AUTO: 6.9 THOUSAND/UL (ref 4.8–10.8)

## 2018-06-23 PROCEDURE — 36415 COLL VENOUS BLD VENIPUNCTURE: CPT | Performed by: FAMILY MEDICINE

## 2018-06-23 PROCEDURE — 85025 COMPLETE CBC W/AUTO DIFF WBC: CPT | Performed by: FAMILY MEDICINE

## 2018-06-23 PROCEDURE — 87205 SMEAR GRAM STAIN: CPT | Performed by: FAMILY MEDICINE

## 2018-06-23 PROCEDURE — 99222 1ST HOSP IP/OBS MODERATE 55: CPT | Performed by: NURSE PRACTITIONER

## 2018-06-23 PROCEDURE — 76882 US LMTD JT/FCL EVL NVASC XTR: CPT

## 2018-06-23 PROCEDURE — 83605 ASSAY OF LACTIC ACID: CPT | Performed by: FAMILY MEDICINE

## 2018-06-23 PROCEDURE — 99239 HOSP IP/OBS DSCHRG MGMT >30: CPT | Performed by: NURSE PRACTITIONER

## 2018-06-23 PROCEDURE — 80048 BASIC METABOLIC PNL TOTAL CA: CPT | Performed by: FAMILY MEDICINE

## 2018-06-23 PROCEDURE — 96361 HYDRATE IV INFUSION ADD-ON: CPT

## 2018-06-23 PROCEDURE — 99213 OFFICE O/P EST LOW 20 MIN: CPT | Performed by: PHYSICIAN ASSISTANT

## 2018-06-23 PROCEDURE — 99285 EMERGENCY DEPT VISIT HI MDM: CPT

## 2018-06-23 PROCEDURE — 96374 THER/PROPH/DIAG INJ IV PUSH: CPT

## 2018-06-23 PROCEDURE — 87147 CULTURE TYPE IMMUNOLOGIC: CPT | Performed by: FAMILY MEDICINE

## 2018-06-23 PROCEDURE — 87070 CULTURE OTHR SPECIMN AEROBIC: CPT | Performed by: FAMILY MEDICINE

## 2018-06-23 RX ORDER — NICOTINE 21 MG/24HR
21 PATCH, TRANSDERMAL 24 HOURS TRANSDERMAL ONCE
Status: COMPLETED | OUTPATIENT
Start: 2018-06-23 | End: 2018-06-24

## 2018-06-23 RX ORDER — FLUOXETINE HYDROCHLORIDE 20 MG/1
80 CAPSULE ORAL DAILY
Status: DISCONTINUED | OUTPATIENT
Start: 2018-06-24 | End: 2018-06-23 | Stop reason: HOSPADM

## 2018-06-23 RX ORDER — KETOROLAC TROMETHAMINE 30 MG/ML
30 INJECTION, SOLUTION INTRAMUSCULAR; INTRAVENOUS ONCE
Status: COMPLETED | OUTPATIENT
Start: 2018-06-23 | End: 2018-06-23

## 2018-06-23 RX ORDER — KETOROLAC TROMETHAMINE 30 MG/ML
15 INJECTION, SOLUTION INTRAMUSCULAR; INTRAVENOUS ONCE
Status: COMPLETED | OUTPATIENT
Start: 2018-06-23 | End: 2018-06-23

## 2018-06-23 RX ORDER — LATANOPROST 50 UG/ML
1 SOLUTION/ DROPS OPHTHALMIC
Status: DISCONTINUED | OUTPATIENT
Start: 2018-06-23 | End: 2018-06-23 | Stop reason: CLARIF

## 2018-06-23 RX ORDER — SIMETHICONE 80 MG
80 TABLET,CHEWABLE ORAL 4 TIMES DAILY PRN
Status: DISCONTINUED | OUTPATIENT
Start: 2018-06-23 | End: 2018-06-23 | Stop reason: HOSPADM

## 2018-06-23 RX ORDER — ONDANSETRON 2 MG/ML
4 INJECTION INTRAMUSCULAR; INTRAVENOUS EVERY 6 HOURS PRN
Status: DISCONTINUED | OUTPATIENT
Start: 2018-06-23 | End: 2018-06-23 | Stop reason: HOSPADM

## 2018-06-23 RX ORDER — ATORVASTATIN CALCIUM 10 MG/1
10 TABLET, FILM COATED ORAL DAILY
Status: DISCONTINUED | OUTPATIENT
Start: 2018-06-24 | End: 2018-06-23 | Stop reason: HOSPADM

## 2018-06-23 RX ORDER — FLUTICASONE PROPIONATE 50 MCG
2 SPRAY, SUSPENSION (ML) NASAL DAILY
Status: DISCONTINUED | OUTPATIENT
Start: 2018-06-24 | End: 2018-06-23 | Stop reason: HOSPADM

## 2018-06-23 RX ORDER — TRAZODONE HYDROCHLORIDE 150 MG/1
150 TABLET ORAL
Status: DISCONTINUED | OUTPATIENT
Start: 2018-06-23 | End: 2018-06-23 | Stop reason: HOSPADM

## 2018-06-23 RX ORDER — CLINDAMYCIN PHOSPHATE 150 MG/ML
600 INJECTION, SOLUTION INTRAVENOUS EVERY 8 HOURS
Status: DISCONTINUED | OUTPATIENT
Start: 2018-06-23 | End: 2018-06-23

## 2018-06-23 RX ORDER — CLINDAMYCIN PHOSPHATE 600 MG/50ML
600 INJECTION INTRAVENOUS EVERY 8 HOURS
Status: DISCONTINUED | OUTPATIENT
Start: 2018-06-24 | End: 2018-06-23 | Stop reason: HOSPADM

## 2018-06-23 RX ORDER — NICOTINE 21 MG/24HR
1 PATCH, TRANSDERMAL 24 HOURS TRANSDERMAL DAILY
Status: DISCONTINUED | OUTPATIENT
Start: 2018-06-24 | End: 2018-06-23 | Stop reason: HOSPADM

## 2018-06-23 RX ORDER — DOCUSATE SODIUM 100 MG/1
100 CAPSULE, LIQUID FILLED ORAL 2 TIMES DAILY
Status: DISCONTINUED | OUTPATIENT
Start: 2018-06-23 | End: 2018-06-23 | Stop reason: HOSPADM

## 2018-06-23 RX ORDER — CLINDAMYCIN PHOSPHATE 900 MG/50ML
900 INJECTION INTRAVENOUS ONCE
Status: COMPLETED | OUTPATIENT
Start: 2018-06-23 | End: 2018-06-23

## 2018-06-23 RX ORDER — CLINDAMYCIN PHOSPHATE 900 MG/50ML
INJECTION INTRAVENOUS
Status: COMPLETED
Start: 2018-06-23 | End: 2018-06-23

## 2018-06-23 RX ORDER — ACETAMINOPHEN 325 MG/1
650 TABLET ORAL ONCE
Status: DISCONTINUED | OUTPATIENT
Start: 2018-06-23 | End: 2018-06-23 | Stop reason: HOSPADM

## 2018-06-23 RX ORDER — SODIUM CHLORIDE 9 MG/ML
1000 INJECTION, SOLUTION INTRAVENOUS ONCE
Status: COMPLETED | OUTPATIENT
Start: 2018-06-23 | End: 2018-06-23

## 2018-06-23 RX ORDER — SODIUM CHLORIDE 9 MG/ML
125 INJECTION, SOLUTION INTRAVENOUS CONTINUOUS
Status: DISCONTINUED | OUTPATIENT
Start: 2018-06-23 | End: 2018-06-23 | Stop reason: HOSPADM

## 2018-06-23 RX ORDER — ACETAMINOPHEN 325 MG/1
650 TABLET ORAL ONCE
Status: COMPLETED | OUTPATIENT
Start: 2018-06-23 | End: 2018-06-23

## 2018-06-23 RX ORDER — VANCOMYCIN HYDROCHLORIDE 1 G/200ML
1000 INJECTION, SOLUTION INTRAVENOUS ONCE
Status: DISCONTINUED | OUTPATIENT
Start: 2018-06-23 | End: 2018-06-23

## 2018-06-23 RX ORDER — KETOROLAC TROMETHAMINE 30 MG/ML
INJECTION, SOLUTION INTRAMUSCULAR; INTRAVENOUS
Status: COMPLETED
Start: 2018-06-23 | End: 2018-06-23

## 2018-06-23 RX ORDER — ACETAMINOPHEN 80 MG/1
650 TABLET, CHEWABLE ORAL ONCE
Status: DISCONTINUED | OUTPATIENT
Start: 2018-06-23 | End: 2018-06-23

## 2018-06-23 RX ORDER — NICOTINE 21 MG/24HR
PATCH, TRANSDERMAL 24 HOURS TRANSDERMAL
Status: DISCONTINUED
Start: 2018-06-23 | End: 2018-06-23 | Stop reason: HOSPADM

## 2018-06-23 RX ORDER — ACETAMINOPHEN 325 MG/1
TABLET ORAL
Status: COMPLETED
Start: 2018-06-23 | End: 2018-06-23

## 2018-06-23 RX ADMIN — Medication 21 MG: at 15:46

## 2018-06-23 RX ADMIN — Medication 21 MG: at 16:01

## 2018-06-23 RX ADMIN — SODIUM CHLORIDE 125 ML/HR: 9 INJECTION, SOLUTION INTRAVENOUS at 19:01

## 2018-06-23 RX ADMIN — CLINDAMYCIN PHOSPHATE 900 MG: 900 INJECTION INTRAVENOUS at 15:35

## 2018-06-23 RX ADMIN — SODIUM CHLORIDE 1000 ML: 0.9 INJECTION, SOLUTION INTRAVENOUS at 13:12

## 2018-06-23 RX ADMIN — KETOROLAC TROMETHAMINE 15 MG: 30 INJECTION, SOLUTION INTRAMUSCULAR; INTRAVENOUS at 19:00

## 2018-06-23 RX ADMIN — SODIUM CHLORIDE 1000 ML/HR: 9 INJECTION, SOLUTION INTRAVENOUS at 15:13

## 2018-06-23 RX ADMIN — NICOTINE 21 MG: 21 PATCH, EXTENDED RELEASE TRANSDERMAL at 16:01

## 2018-06-23 RX ADMIN — KETOROLAC TROMETHAMINE 30 MG: 30 INJECTION, SOLUTION INTRAMUSCULAR at 12:45

## 2018-06-23 RX ADMIN — VANCOMYCIN HYDROCHLORIDE 1000 MG: 1 INJECTION, POWDER, LYOPHILIZED, FOR SOLUTION INTRAVENOUS at 16:59

## 2018-06-23 RX ADMIN — SODIUM CHLORIDE 1000 ML/HR: 9 INJECTION, SOLUTION INTRAVENOUS at 15:59

## 2018-06-23 RX ADMIN — CLINDAMYCIN PHOSPHATE 900 MG: 18 INJECTION, SOLUTION INTRAVENOUS at 15:35

## 2018-06-23 RX ADMIN — ACETAMINOPHEN 650 MG: 325 TABLET ORAL at 13:00

## 2018-06-23 RX ADMIN — DOCUSATE SODIUM 100 MG: 100 CAPSULE, LIQUID FILLED ORAL at 18:59

## 2018-06-23 NOTE — ASSESSMENT & PLAN NOTE
· Tobacco cessation discussed with the patient at this time she is not ready to quit  · Will use nicotine patch while in the hospital

## 2018-06-23 NOTE — ASSESSMENT & PLAN NOTE
· The patient has fever and hypotension with SBP 70-80s  Sepsis is secondary to cellulitis of right upper back  · Will continue vancomycin and clindamycin  · Lactate level is 0 6  · Will continue aggressive IV fluid  · Follow up with labs in a m Rosella Goldmann

## 2018-06-23 NOTE — PROGRESS NOTES
[facility]        NAME: Shabnam Johnston is a 63 y o  female  : 1960    MRN: [id number]  DATE: 2018  TIME: 9:26 AM    Assessment and Plan   Spider bite allergy, current reaction, accidental or unintentional, initial encounter [T63 301A]  1  Spider bite allergy, current reaction, accidental or unintentional, initial encounter           Patient Instructions   Cellulitis secondary to a spider bite  The patient currently is febrile with chills  I advised her to immediately proceed to the emergency room for further testing and possible IV antibiotics  She has agreed to drive herself to the [facility] [place]  I have contacted PAC of her arrival       Chief Complaint     Chief Complaint   Patient presents with   [address]     occurred on Tue  saw pcp on Thurs and was prescribed bactrim and an ointment  very painful  area is increasing in size  History of Present Illness   The patient is a 60-year-old female who presents with a spider bite that occurred approximately 4 days ago  She states that she noticed a large black spider in her room about a week ago but was unable to catch it  Four days ago she awoke in the morning and felt severe pain of her right shoulder area  When she went to change her bed sheets she found the black spider in her bed in which she was able to kill  Unfortunately she did throw the diet spider away  She developed more pain and redness at the spider bite site and saw her PCP  He prescribed Bactrim and mupirocin ointment  Since then she has had increased pain and swelling of the lesion and developed fever and chills yesterday  Her current temperature today in office is 101 F  She appears ill  She states that she has a past medical history of chronic fatigue syndrome and fibromyalgia and has not noticed any more fatigue or joint pain than usual   Negative abdominal pain, nausea, vomiting or diarrhea  Negative blood in her urine or stools  Negative headache or dizziness  Negative syncope  Negative chest pain or palpitations  Negative shortness of breath  HPI    Review of Systems   Review of Systems   Constitutional: Positive for activity change, chills, fatigue and fever  Negative for appetite change  HENT: Negative  Eyes: Negative  Respiratory: Negative  Negative for shortness of breath, wheezing and stridor  Cardiovascular: Negative for chest pain, palpitations and leg swelling  Gastrointestinal: Negative for abdominal distention, blood in stool, diarrhea, nausea and vomiting  Genitourinary: Negative for difficulty urinating  Musculoskeletal: Positive for arthralgias, myalgias, neck pain and neck stiffness  Negative for joint swelling  Skin: Positive for color change, rash and wound  Negative for pallor  Neurological: Negative for dizziness, tremors, seizures, syncope, facial asymmetry, speech difficulty, weakness, light-headedness, numbness and headaches  Psychiatric/Behavioral: The patient is nervous/anxious  All other systems reviewed and are negative          Current Medications       Current Outpatient Prescriptions:     atorvastatin (LIPITOR) 10 mg tablet, Take 1 tablet by mouth daily, Disp: , Rfl:     FLUoxetine (PROzac) 20 mg capsule, Take 4 capsules (80 mg total) by mouth daily, Disp: 360 capsule, Rfl: 3    fluticasone (FLONASE) 50 mcg/act nasal spray, 2 sprays into each nostril daily, Disp: , Rfl:     LUMIGAN 0 01 % ophthalmic drops, Administer 1 drop to both eyes daily, Disp: , Rfl:     mupirocin (BACTROBAN) 2 % ointment, Apply topically 3 (three) times a day, Disp: 22 g, Rfl: 0    oxyCODONE (ROXICODONE) 5 mg immediate release tablet, Take 1 tablet by mouth 2 (two) times a day, Disp: , Rfl:     sulfamethoxazole-trimethoprim (BACTRIM DS) 800-160 mg per tablet, Take 1 tablet by mouth every 12 (twelve) hours for 7 days, Disp: 14 tablet, Rfl: 0    traZODone (DESYREL) 150 mg tablet, Take 1 tablet (150 mg total) by mouth daily at bedtime, Disp: 90 tablet, Rfl: 3    Current Allergies     Allergies as of 06/23/2018 - Reviewed 06/23/2018   Allergen Reaction Noted    Morphine  05/09/2016    Morphine and related  05/08/2012            The following portions of the patient's history were reviewed and updated as appropriate: allergies, current medications, past family history, past medical history, past social history, past surgical history and problem list      Past Medical History:   Diagnosis Date    Anxiety     last assessed 11/17/17    Arthritis     Chronic fatigue syndrome     last assessed 2/25/14    Chronic hepatitis C ([facility] [address] )     last assessed 4/12/17    Depression     Esophageal reflux     last assessed 10/16/13    Fibromyalgia     Gallbladder disease     Glaucoma     Hyperlipidemia     last assessed 4/12/17    Insomnia        Past Surgical History:   Procedure Laterality Date    APPENDECTOMY      resolved 8/2000    EAR SURGERY      resolved 4/5/00    GASTRIC BYPASS      resolved 7/06    NEUROPLASTY / TRANSPOSITION MEDIAN NERVE AT CARPAL TUNNEL BILATERAL      resolved 10/11    ROTATOR CUFF REPAIR         Family History   Problem Relation Age of Onset    Arthritis Mother     Coronary artery disease Mother     Mental illness Mother     Heart disease Mother     Hypertension Mother     Mental illness Father     Coronary artery disease Family          Medications have been verified  Objective   /72 (BP Location: Left arm, Patient Position: Sitting)   Pulse 69   Temp (!) 101 °F (38 3 °C) (Temporal)   Resp 20   Ht 5' 4" (1 626 m)   Wt 59 kg (130 lb)   SpO2 96%   BMI 22 31 kg/m²        Physical Exam     Physical Exam   Constitutional: She appears well-developed and well-nourished  She appears distressed  HENT:   Head: Normocephalic and atraumatic     Right Ear: External ear normal    Left Ear: External ear normal    Nose: Nose normal    Mouth/Throat: Oropharynx is clear and moist  No oropharyngeal exudate  Eyes: Conjunctivae and EOM are normal  Pupils are equal, round, and reactive to light  Right eye exhibits no discharge  Left eye exhibits no discharge  No scleral icterus  Neck: Normal range of motion  No JVD present  Cardiovascular: Normal rate, regular rhythm, normal heart sounds and intact distal pulses  Exam reveals no gallop and no friction rub  No murmur heard  Pulmonary/Chest: Effort normal and breath sounds normal  No stridor  No respiratory distress  She has no wheezes  She has no rales  She exhibits no tenderness  Abdominal: Soft  Bowel sounds are normal  She exhibits no distension  There is no tenderness  Musculoskeletal: Normal range of motion  She exhibits no edema, tenderness or deformity  Lymphadenopathy:     She has no cervical adenopathy  Neurological: She is alert  Skin: Skin is warm  Lesion and rash noted  Rash is vesicular  She is diaphoretic  Psychiatric: Her mood appears anxious  Nursing note and vitals reviewed

## 2018-06-23 NOTE — ED TRIAGE NOTES
Patient states she was bitten by an insect 4 days ago on the right upper back  Patient was seen by Dr Renetta Wilson on Thursday and was prescribed an antibiotic and a cream  Patient states she has been experiencing an increase in fever and chills

## 2018-06-23 NOTE — PATIENT INSTRUCTIONS
Cellulitis secondary to a spider bite  The patient currently is febrile with chills  I advised her to immediately proceed to the emergency room for further testing and possible IV antibiotics  She has agreed to drive herself to the [facility]  I have contacted PAC of her arrival       Insect Bite or Sting   AMBULATORY CARE:   Most insect bites and stings  are not dangerous and go away without treatment  Common examples of insects that bite or sting are bees, ticks, mosquitoes, spiders, and ants  Insect bites or stings can lead to diseases such as malaria, West Nile virus, Lyme disease, or [place] Spotted Fever  Common signs and symptoms:   · Mild symptoms  include a red bump, pain, swelling, and itching  · Anaphylaxis symptoms  include throat tightness, trouble breathing, tingling, dizziness, and wheezing  Anaphylaxis is a sudden, life-threatening reaction that needs immediate treatment  Call 911 for signs or symptoms of anaphylaxis,  such as trouble breathing, swelling in your mouth or throat, or wheezing  You may also have itching, a rash, hives, or feel like you are going to faint  Seek care immediately if:   · You are stung on your tongue or in your throat  · A white area forms around the bite  · You are sweating badly or have body pain  · You think you were bitten or stung by a poisonous insect  Contact your healthcare provider if:   · You have a fever  · The area becomes red, warm, tender, and swollen beyond the area of the bite or sting  · You have questions or concerns about your condition or care  Steps to take for signs or symptoms of anaphylaxis:   · Immediately  give 1 shot of epinephrine only into the outer thigh muscle  · Leave the shot in place  as directed  Your healthcare provider may recommend you leave it in place for up to 10 seconds before you remove it  This helps make sure all of the epinephrine is delivered       · Call 911 and go to the emergency department,  even if the shot improved symptoms  Do not drive yourself  Bring the used epinephrine shot with you  Treatment  depends on how severe your symptoms are and if you had anaphylaxis before  You may need any of the following:  · Antihistamines  decrease mild symptoms such as itching and rash  · Epinephrine  is medicine used to treat severe allergic reactions such as anaphylaxis  If an insect bites or stings you:   · Remove the stinger  Scrape the stinger out with your fingernail, edge of a credit card, or a knife blade  Do not squeeze the wound  Gently wash the area with soap and water  · Remove the tick  Ticks must be removed as soon as possible so you do not get diseases passed through tick bites  Ask your healthcare provider for more information on tick bites and how to remove ticks  Care for your bite or sting wound:   · Elevate the affected area  Prop the wound above the level of your heart, if possible  Elevate the area for 10 to 20 minutes each hour or as directed by your healthcare provider  · Apply compresses  Soak a clean washcloth in cold water, wring it out, and put it on the bite or sting  Use the compress for 10 to 20 minutes each hour or as directed by your healthcare provider  After 24 to 48 hours, change to warm compresses  · Apply a baking soda paste  Add water to baking soda to make a thick paste  Put the paste on the area for 5 minutes  Rinse gently to remove the paste  Safety precautions to take if you are at risk for anaphylaxis:   · Keep 2 shots of epinephrine with you at all times  You may need a second shot, because epinephrine only works for about 20 minutes and symptoms may return  Your healthcare provider can show you and family members how to give the shot  Check the expiration date every month and replace it before it expires  · Create an action plan    Your healthcare provider can help you create a written plan that explains the allergy and an emergency plan to treat a reaction  The plan explains when to give a second epinephrine shot if symptoms return or do not improve after the first  Give copies of the action plan and emergency instructions to family members, work and school staff, and  providers  Show them how to give a shot of epinephrine  · Carry medical alert identification  Wear medical alert jewelry or carry a card that says you have an insect allergy  Ask your healthcare provider where to get these items  Prevent an insect bite or sting:   · Do not wear bright-colored or flower-print clothing when you plan to spend time outdoors  Do not use hairspray, perfumes, or aftershave  · Do not leave food out  · Empty any standing water  Wash containers with soap and water every 2 days  · Put screens on all open windows and doors  · Put insect repellent that contains DEET on skin that is showing when you go outside  Put insect repellent at the top of your boots, bottom of pant legs, and sleeve cuffs  Wear long sleeves, pants, and shoes  · Use citronella candles outdoors to help keep mosquitoes away  Put a tick and flea collar on pets  Follow up with your healthcare provider as directed:  Write down your questions so you remember to ask them during your visits  © 2017 [employer]  Information is for End User's use only and may not be sold, redistributed or otherwise used for commercial purposes  All illustrations and images included in CareNotes® are the copyrighted property of A [employer] M [employer] Inc  or [employer]  The above information is an  only  It is not intended as medical advice for individual conditions or treatments  Talk to your doctor, nurse or pharmacist before following any medical regimen to see if it is safe and effective for you

## 2018-06-23 NOTE — PROGRESS NOTES
Progress Note - Charles Mcgrath 1960, 63 y o  female MRN: [id number]    Unit/Bed#: ED 06 Encounter: [id number]    Primary Care Provider: Keerthi Walker MD   Date and time admitted to hospital: 6/23/2018 12:02 PM        * Sepsis [facility])   Assessment & Plan    · The patient has fever and hypotension with SBP 70-80s  Sepsis is secondary to cellulitis of right upper back  · Will continue vancomycin and clindamycin  · Lactate level is 0 6  · Will continue aggressive IV fluid  · Follow up with labs in a m           Infected insect bite   Assessment & Plan    · Will continue treatment stated above  · Ultrasound did not show any abscess  If not much improved within the next 24 hr will consider consulting surgery  · Local wound care with neosporin        Tobacco abuse   Assessment & Plan    · Tobacco cessation discussed with the patient at this time she is not ready to quit  · Will use nicotine patch while in the hospital         Hyperlipidemia   Assessment & Plan    · Continue statin        Chronic insomnia   Assessment & Plan    · Continue trazodone at nighttime        Anxiety   Assessment & Plan    · Continue with Prozac  VTE Prophylaxis: Enoxaparin (Lovenox)  / sequential compression device   Code Status:  Full code  POLST: There is no POLST form on file for this patient (pre-hospital)  Discussion with family:      Anticipated Length of Stay:  Patient will be admitted on an Observation basis with an anticipated length of stay of  2 midnights  Justification for Hospital Stay:   Cellulitis of the right upper back fail outpatient therapy  Total Time for Visit, including Counseling / Coordination of Care: 30 minutes  Greater than 50% of this total time spent on direct patient counseling and coordination of care      Chief Complaint:   Upper back pain    History of Present Illness:    Charles Mcgrath is a 63 y o  female who presents with complaint of worsening of right upper back of erythema, tenderness and swelling  Patient was seen by PCP on 6/21 after noticed a spider bite on her back and was started on bactrim  Today she went back to urgent care as this the wound is getting worse with increase swelling and drainage  Urgent recommended patient to come to ED  Review of Systems:    Review of Systems   Constitutional: Positive for chills, fatigue and fever  Negative for appetite change  HENT: Negative for congestion, ear discharge, ear pain and nosebleeds  Eyes: Negative for pain, discharge and itching  Respiratory: Negative for cough, chest tightness and shortness of breath  Cardiovascular: Negative for chest pain, palpitations and leg swelling  Gastrointestinal: Positive for nausea and vomiting  Negative for abdominal pain  Before came to ED     Endocrine: Negative for cold intolerance, heat intolerance and polydipsia  Genitourinary: Negative for difficulty urinating, dysuria, flank pain and frequency  Musculoskeletal: Positive for back pain  Negative for joint swelling, neck pain and neck stiffness  Skin: Positive for color change and wound (right upper back )  Negative for rash  Allergic/Immunologic: Negative for environmental allergies and food allergies  Neurological: Negative for syncope, light-headedness and headaches  Hematological: Negative for adenopathy  Psychiatric/Behavioral: Negative for behavioral problems, confusion and hallucinations         Past Medical and Surgical History:     Past Medical History:   Diagnosis Date    Anxiety     last assessed 11/17/17    Arthritis     Chronic fatigue syndrome     last assessed 2/25/14    Chronic hepatitis C ([facility] [address] )     last assessed 4/12/17    Depression     Esophageal reflux     last assessed 10/16/13    Fibromyalgia     Gallbladder disease     Glaucoma     Hyperlipidemia     last assessed 4/12/17    Insomnia        Past Surgical History:   Procedure Laterality Date    APPENDECTOMY      resolved 8/2000  CHOLECYSTECTOMY      EAR SURGERY      resolved 4/5/00    GASTRIC BYPASS      resolved 7/06    NEUROPLASTY / TRANSPOSITION MEDIAN NERVE AT CARPAL TUNNEL BILATERAL      resolved 10/11    ROTATOR CUFF REPAIR         Meds/Allergies:    Prior to Admission medications    Medication Sig Start Date End Date Taking? Authorizing Provider   atorvastatin (LIPITOR) 10 mg tablet Take 1 tablet by mouth daily 2/25/14   Historical Provider, MD   FLUoxetine (PROzac) 20 mg capsule Take 4 capsules (80 mg total) by mouth daily 4/25/18   Ted Munoz MD   fluticasone [facility]) 50 mcg/act nasal spray 2 sprays into each nostril daily 11/17/17   Historical Provider, MD GAN 0 01 % ophthalmic drops Administer 1 drop to both eyes daily 4/3/18   Historical Provider, MD   mupirocin (BACTROBAN) 2 % ointment Apply topically 3 (three) times a day 6/21/18   Ted Munoz MD   oxyCODONE (ROXICODONE) 5 mg immediate release tablet Take 1 tablet by mouth 2 (two) times a day 4/17/18   Historical Provider, MD   sulfamethoxazole-trimethoprim (BACTRIM DS) 800-160 mg per tablet Take 1 tablet by mouth every 12 (twelve) hours for 7 days 6/21/18 6/28/18  Ted Munoz MD   traZODone (DESYREL) 150 mg tablet Take 1 tablet (150 mg total) by mouth daily at bedtime 4/25/18   Ted Munoz MD     I have reviewed home medications with patient personally  Allergies:    Allergies   Allergen Reactions    Morphine     Morphine And Related        Social History:     Marital Status: /Civil Union   Patient Pre-hospital Living Situation:  lives at home    Patient Pre-hospital Level of Mobility: independent   Patient Pre-hospital Diet Restrictions: none  Substance Use History:   History   Alcohol Use    7 2 oz/week    12 Cans of beer per week     Comment: on the weekends     History   Smoking Status    Current Every Day Smoker    Packs/day: 1 50    Years: 40 00    Types: Cigarettes   Smokeless Tobacco    Never Used     History   Drug Use No       Family History:    non-contributory    Physical Exam:     Vitals:   Blood Pressure: 103/53 (06/23/18 1700)  Pulse: 76 (06/23/18 1700)  Temperature: 99 3 °F (37 4 °C) (06/23/18 1700)  Temp Source: Temporal (06/23/18 1700)  Respirations: 16 (06/23/18 1700)  Height: 5' 4" (162 6 cm) (06/23/18 1204)  Weight - Scale: 59 kg (130 lb) (06/23/18 1204)  SpO2: 96 % (06/23/18 1700)    Physical Exam   Constitutional: She is oriented to person, place, and time  She appears well-developed and well-nourished  She appears distressed (mild distress from pain after attempt for right IJ)  HENT:   Head: Normocephalic and atraumatic  Mouth/Throat: Oropharynx is clear and moist    Eyes: Conjunctivae and EOM are normal  Pupils are equal, round, and reactive to light  Neck: Normal range of motion  Neck supple  Erythema present  No thyromegaly present  Cardiovascular: Normal rate, regular rhythm and normal heart sounds  Pulmonary/Chest: Effort normal and breath sounds normal  No respiratory distress  She has no wheezes  Abdominal: Soft  Bowel sounds are normal  She exhibits no distension  Musculoskeletal: Normal range of motion  She exhibits no edema or deformity  Neurological: She is alert and oriented to person, place, and time  She has normal reflexes  Skin: Skin is warm and dry  Rash noted  Rash is pustular  There is erythema  Psychiatric: She has a normal mood and affect  Her behavior is normal  Thought content normal            Additional Data:     Lab Results: I have personally reviewed pertinent reports          Results from last 7 days  Lab Units 06/23/18  1257   WBC Thousand/uL 6 90   HEMOGLOBIN g/dL 12 7   HEMATOCRIT % 37 2   PLATELETS Thousands/uL 153   NEUTROS PCT % 79*   LYMPHS PCT % 9*   MONOS PCT % 11   EOS PCT % 0       Results from last 7 days  Lab Units 06/23/18  1257   SODIUM mmol/L 125*   POTASSIUM mmol/L 3 8   CHLORIDE mmol/L 93*   CO2 mmol/L 25   BUN mg/dL 5*   CREATININE mg/dL 0 70   CALCIUM mg/dL 9 0   GLUCOSE RANDOM mg/dL 109*                   Imaging: I have personally reviewed pertinent reports  US extremity soft tissue   Final Result by Neto Esparza (06/23 9130)          EKG, Pathology, and Other Studies Reviewed on Admission:   · EKG: Normal sinus rhythm    Allscripts / Epic Records Reviewed: Yes     ** Please Note: This note has been constructed using a voice recognition system   **

## 2018-06-23 NOTE — ED PROVIDER NOTES
History  Chief Complaint   Patient presents with    Fever - 9 weeks to 74 years     Infected bug bite       History provided by:  Patient and spouse   used: No    Abscess   Abscess location: right upper back  Abscess quality: induration, painful, redness, warmth and weeping    Red streaking: yes    Duration:  5 days  Progression:  Worsening  Pain details:     Quality:  Throbbing and hot    Severity:  Severe    Duration:  5 days    Timing:  Constant    Progression:  Worsening  Chronicity:  New  Context: insect bite/sting    Context: not diabetes, not immunosuppression, not injected drug use and not skin injury    Relieved by:  Nothing  Worsened by:  Nothing  Ineffective treatments:  Oral antibiotics  Associated symptoms: fatigue and fever    Associated symptoms: no anorexia, no nausea and no vomiting    Risk factors: no family hx of MRSA        Prior to Admission Medications   Prescriptions Last Dose Informant Patient Reported? Taking?    FLUoxetine (PROzac) 20 mg capsule   No No   Sig: Take 4 capsules (80 mg total) by mouth daily   LUMIGAN 0 01 % ophthalmic drops   Yes No   Sig: Administer 1 drop to both eyes daily   atorvastatin (LIPITOR) 10 mg tablet   Yes No   Sig: Take 1 tablet by mouth daily   fluticasone (FLONASE) 50 mcg/act nasal spray   Yes No   Si sprays into each nostril daily   mupirocin (BACTROBAN) 2 % ointment   No No   Sig: Apply topically 3 (three) times a day   oxyCODONE (ROXICODONE) 5 mg immediate release tablet   Yes No   Sig: Take 1 tablet by mouth 2 (two) times a day   sulfamethoxazole-trimethoprim (BACTRIM DS) 800-160 mg per tablet   No No   Sig: Take 1 tablet by mouth every 12 (twelve) hours for 7 days   traZODone (DESYREL) 150 mg tablet   No No   Sig: Take 1 tablet (150 mg total) by mouth daily at bedtime      Facility-Administered Medications: None       Past Medical History:   Diagnosis Date    Anxiety     last assessed 17    Arthritis     Chronic fatigue syndrome     last assessed 2/25/14    Chronic hepatitis C ([facility] [address] )     last assessed 4/12/17    Depression     Esophageal reflux     last assessed 10/16/13    Fibromyalgia     Gallbladder disease     Glaucoma     Hyperlipidemia     last assessed 4/12/17    Insomnia        Past Surgical History:   Procedure Laterality Date    APPENDECTOMY      resolved 8/2000    CHOLECYSTECTOMY      EAR SURGERY      resolved 4/5/00    GASTRIC BYPASS      resolved 7/06    NEUROPLASTY / TRANSPOSITION MEDIAN NERVE AT CARPAL TUNNEL BILATERAL      resolved 10/11    ROTATOR CUFF REPAIR         Family History   Problem Relation Age of Onset    Arthritis Mother     Coronary artery disease Mother     Mental illness Mother     Heart disease Mother     Hypertension Mother     Mental illness Father     Coronary artery disease Family      I have reviewed and agree with the history as documented  Social History   Substance Use Topics    Smoking status: Current Every Day Smoker     Packs/day: 1 00     Types: Cigarettes    Smokeless tobacco: Never Used    Alcohol use 7 2 oz/week     12 Cans of beer per week      Comment: on the weekends        Review of Systems   Constitutional: Positive for fatigue and fever  HENT: Negative  Eyes: Negative  Respiratory: Negative  Cardiovascular: Negative  Gastrointestinal: Negative  Negative for anorexia, nausea and vomiting  Endocrine: Negative  Genitourinary: Negative  Musculoskeletal: Positive for arthralgias  Right upper back pain    Allergic/Immunologic: Negative  Neurological: Negative  Hematological: Negative  Psychiatric/Behavioral: Negative  Physical Exam  Physical Exam   Constitutional: She is oriented to person, place, and time  She appears well-developed and well-nourished  HENT:   Head: Normocephalic and atraumatic     Right Ear: External ear normal    Left Ear: External ear normal    Nose: Nose normal    Mouth/Throat: Oropharynx is clear and moist  No oropharyngeal exudate  Eyes: Conjunctivae and EOM are normal  Pupils are equal, round, and reactive to light  Right eye exhibits no discharge  Left eye exhibits no discharge  No scleral icterus  Neck: Normal range of motion  Neck supple  Cardiovascular: Normal rate and regular rhythm  Pulmonary/Chest: Effort normal and breath sounds normal  No respiratory distress  She has no wheezes  Abdominal: Soft  Bowel sounds are normal    Lymphadenopathy:     She has no cervical adenopathy  Neurological: She is alert and oriented to person, place, and time  Skin: Skin is warm  Capillary refill takes less than 2 seconds  There is erythema ([id number] cellulitis is noted with central abscess )  Psychiatric: She has a normal mood and affect  Her behavior is normal    Nursing note and vitals reviewed        Vital Signs  ED Triage Vitals   Temperature Pulse Respirations Blood Pressure SpO2   06/23/18 1203 06/23/18 1204 06/23/18 1204 06/23/18 1204 06/23/18 1204   (!) 101 7 °F (38 7 °C) 84 16 114/91 99 %      Temp Source Heart Rate Source Patient Position - Orthostatic VS BP Location FiO2 (%)   06/23/18 1203 06/23/18 1204 06/23/18 1204 06/23/18 1204 --   Temporal Monitor Lying Left arm       Pain Score       06/23/18 1204       Worst Possible Pain           Vitals:    06/23/18 1204 06/23/18 1442 06/23/18 1518   BP: 114/91 (!) 87/43 (!) 77/38   Pulse: 84 73 85   Patient Position - Orthostatic VS: Lying  Lying     Lab Results   Component Value Date    WBC 6 90 06/23/2018    HGB 12 7 06/23/2018    HCT 37 2 06/23/2018    MCV 90 06/23/2018     06/23/2018     Lab Results   Component Value Date     (L) 06/23/2018    K 3 8 06/23/2018    CL 93 (L) 06/23/2018    CO2 25 06/23/2018    ANIONGAP 7 06/23/2018    BUN 5 (L) 06/23/2018    CREATININE 0 70 06/23/2018    GLUCOSE 109 (H) 06/23/2018    CALCIUM 9 0 06/23/2018    AST 34 (H) 04/11/2018    ALT 24 04/11/2018    ALKPHOS 52 04/11/2018    PROT 7 3 04/11/2018    BILITOT 0 4 04/11/2018    EGFR 96 06/23/2018     US extremity soft tissue   Final Result          Visual Acuity      ED Medications  Medications   acetaminophen (TYLENOL) tablet 650 mg (650 mg Oral Not Given 6/23/18 1508)   clindamycin (CLEOCIN) IVPB (premix) 900 mg (900 mg Intravenous New Bag 6/23/18 1535)   vancomycin (VANCOCIN) 1,000 mg in sodium chloride 0 9 % 250 mL IVPB (1,000 mg Intravenous Canceled Entry 6/23/18 1505)   nicotine (NICODERM CQ) 21 mg/24 hr TD 24 hr patch 21 mg (21 mg Transdermal Medication Applied 6/23/18 1546)   sodium chloride 0 9 % bolus 1,000 mL (0 mL Intravenous Stopped 6/23/18 1501)   ketorolac (TORADOL) injection 30 mg (0 mg Intravenous Override Pull 6/23/18 1509)   acetaminophen (TYLENOL) tablet 650 mg (0 mg Oral Override Pull 6/23/18 1510)   sodium chloride 0 9 % infusion (0 mL/hr Intravenous Stopped 6/23/18 1555)       Diagnostic Studies  Results Reviewed     None                 US extremity soft tissue   Final Result by Lalo Diaz (06/23 1355)                 Procedures  Procedures       Phone Contacts  ED Phone Contact    ED Course  ED Course as of Jun 23 1555   Sat Jun 23, 2018   1442 US extremity soft tissue   2007 US extremity soft tissue                               MDM  Number of Diagnoses or Management Options     Amount and/or Complexity of Data Reviewed  Clinical lab tests: ordered  Tests in the radiology section of CPT®: ordered    Risk of Complications, Morbidity, and/or Mortality  Presenting problems: moderate  Diagnostic procedures: moderate  Management options: moderate      CritCare Time    Disposition  Final diagnoses:   Cellulitis of back   Abscess   Infected insect bite of back, right, initial encounter     Time reflects when diagnosis was documented in both MDM as applicable and the Disposition within this note     Time User Action Codes Description Comment    6/23/2018  2:25 PM Dat Marrero Add [L03 312] Cellulitis of back     6/23/2018  2:26 PM Day Chan Add [L02 91] Abscess     6/23/2018  2:27 PM Day Chan Add [S20 161A] Infected insect bite of back, right, initial encounter       ED Disposition     ED Disposition Condition Comment    Admit  Case was discussed with Dr Eloise Araiza and the patient's admission status was agreed to be Admission Status: observation status to the service of Dr Eloise Araiza   Follow-up Information    None         Patient's Medications   Discharge Prescriptions    No medications on file     No discharge procedures on file      ED Provider  Electronically Signed by           [place] Duke MD  06/23/18 2000 Taco Jordan MD  06/23/18 Caren Barron  106, MD  06/23/18 1555       [place] Duke MD  06/23/18 5451

## 2018-06-23 NOTE — H&P
H&P- Wes Sharma 1960, 63 y o  female MRN: [id number]    Unit/Bed#: -02 Encounter: [id number]    Primary Care Provider: Yumi Tillman MD   Date and time admitted to hospital: 6/23/2018 12:02 PM    * Sepsis [facility])   Assessment & Plan     · The patient has fever and hypotension with SBP 70-80s  Sepsis is secondary to cellulitis of right upper back  · Will continue vancomycin and clindamycin  · Lactate level is 0 6  · Will continue aggressive IV fluid  · Follow up with labs in a m             Infected insect bite   Assessment & Plan     · Will continue treatment stated above  · Ultrasound did not show any abscess  If not much improved within the next 24 hr will consider consulting surgery  · Local wound care with neosporin          Tobacco abuse   Assessment & Plan     · Tobacco cessation discussed with the patient at this time she is not ready to quit  · Will use nicotine patch while in the hospital           Hyperlipidemia   Assessment & Plan     · Continue statin          Chronic insomnia   Assessment & Plan     · Continue trazodone at nighttime          Anxiety   Assessment & Plan     · Continue with Prozac                 VTE Prophylaxis: Enoxaparin (Lovenox)  / sequential compression device   Code Status:  Full code  POLST: There is no POLST form on file for this patient (pre-hospital)  Discussion with family:       Anticipated Length of Stay:  Patient will be admitted on an Observation basis with an anticipated length of stay of  2 midnights  Justification for Hospital Stay:   Cellulitis of the right upper back fail outpatient therapy      Total Time for Visit, including Counseling / Coordination of Care: 30 minutes    Greater than 50% of this total time spent on direct patient counseling and coordination of care      Chief Complaint:   Upper back pain     History of Present Illness:     Wes Sharma is a 63 y o  female who presents with complaint of worsening of right upper back of erythema, tenderness and swelling  Patient was seen by PCP on 6/21 after noticed a spider bite on her back and was started on bactrim  Today she went back to urgent care as this the wound is getting worse with increase swelling and drainage  Urgent recommended patient to come to ED       Review of Systems:     Review of Systems   Constitutional: Positive for chills, fatigue and fever  Negative for appetite change  HENT: Negative for congestion, ear discharge, ear pain and nosebleeds  Eyes: Negative for pain, discharge and itching  Respiratory: Negative for cough, chest tightness and shortness of breath  Cardiovascular: Negative for chest pain, palpitations and leg swelling  Gastrointestinal: Positive for nausea and vomiting  Negative for abdominal pain  Before came to ED     Endocrine: Negative for cold intolerance, heat intolerance and polydipsia  Genitourinary: Negative for difficulty urinating, dysuria, flank pain and frequency  Musculoskeletal: Positive for back pain  Negative for joint swelling, neck pain and neck stiffness  Skin: Positive for color change and wound (right upper back )  Negative for rash  Allergic/Immunologic: Negative for environmental allergies and food allergies  Neurological: Negative for syncope, light-headedness and headaches  Hematological: Negative for adenopathy     Psychiatric/Behavioral: Negative for behavioral problems, confusion and hallucinations          Past Medical and Surgical History:      Medical History        Past Medical History:   Diagnosis Date    Anxiety       last assessed 11/17/17    Arthritis      Chronic fatigue syndrome       last assessed 2/25/14    [facility]       last assessed 4/12/17    Depression      Esophageal reflux       last assessed 10/16/13    Fibromyalgia      Gallbladder disease      Glaucoma      Hyperlipidemia       last assessed 4/12/17    Insomnia              Surgical History Past Surgical History:   Procedure Laterality Date    APPENDECTOMY         resolved 8/2000    CHOLECYSTECTOMY        EAR SURGERY         resolved 4/5/00    GASTRIC BYPASS         resolved 7/06    NEUROPLASTY / TRANSPOSITION MEDIAN NERVE AT CARPAL TUNNEL BILATERAL         resolved 10/11    ROTATOR CUFF REPAIR                Meds/Allergies:             Prior to Admission medications    Medication Sig Start Date End Date Taking? Authorizing Provider   atorvastatin (LIPITOR) 10 mg tablet Take 1 tablet by mouth daily 2/25/14     Historical Provider, MD   FLUoxetine (PROzac) 20 mg capsule Take 4 capsules (80 mg total) by mouth daily 4/25/18     Margot Sanabria MD   fluticasone [facility]) 50 mcg/act nasal spray 2 sprays into each nostril daily 11/17/17     Historical Provider, MD GAN 0 01 % ophthalmic drops Administer 1 drop to both eyes daily 4/3/18     Historical Provider, MD   mupirocin (BACTROBAN) 2 % ointment Apply topically 3 (three) times a day 6/21/18     Margot Sanabria MD   oxyCODONE (ROXICODONE) 5 mg immediate release tablet Take 1 tablet by mouth 2 (two) times a day 4/17/18     Historical Provider, MD   sulfamethoxazole-trimethoprim (BACTRIM DS) 800-160 mg per tablet Take 1 tablet by mouth every 12 (twelve) hours for 7 days 6/21/18 6/28/18   Margot Sanabria MD   traZODone (DESYREL) 150 mg tablet Take 1 tablet (150 mg total) by mouth daily at bedtime 4/25/18     Margot Sanabria MD      I have reviewed home medications with patient personally      Allergies:         Allergies   Allergen Reactions    Morphine      Morphine And Related           Social History:     Marital Status: /Civil Union   Patient Pre-hospital Living Situation:  lives at home        Patient Pre-hospital Level of Mobility: independent   Patient Pre-hospital Diet Restrictions: none  Substance Use History:        History   Alcohol Use    7 2 oz/week    12 Cans of beer per week       Comment: on the weekends     History   Smoking Status    Current Every Day Smoker    Packs/day: 1 50    Years: 40 00    Types: Cigarettes   Smokeless Tobacco    Never Used          History   Drug Use No         Family History:     non-contributory     Physical Exam:      Vitals:   Blood Pressure: 103/53 (06/23/18 1700)  Pulse: 76 (06/23/18 1700)  Temperature: 99 3 °F (37 4 °C) (06/23/18 1700)  Temp Source: Temporal (06/23/18 1700)  Respirations: 16 (06/23/18 1700)  Height: 5' 4" (162 6 cm) (06/23/18 1204)  Weight - Scale: 59 kg (130 lb) (06/23/18 1204)  SpO2: 96 % (06/23/18 1700)     Physical Exam   Constitutional: She is oriented to person, place, and time  She appears well-developed and well-nourished  She appears distressed (mild distress from pain after attempt for right IJ)  HENT:   Head: Normocephalic and atraumatic  Mouth/Throat: Oropharynx is clear and moist    Eyes: Conjunctivae and EOM are normal  Pupils are equal, round, and reactive to light  Neck: Normal range of motion  Neck supple  Erythema present  No thyromegaly present  Cardiovascular: Normal rate, regular rhythm and normal heart sounds  Pulmonary/Chest: Effort normal and breath sounds normal  No respiratory distress  She has no wheezes  Abdominal: Soft  Bowel sounds are normal  She exhibits no distension  Musculoskeletal: Normal range of motion  She exhibits no edema or deformity  Neurological: She is alert and oriented to person, place, and time  She has normal reflexes  Skin: Skin is warm and dry  Rash noted  Rash is pustular  There is erythema  Psychiatric: She has a normal mood and affect   Her behavior is normal  Thought content normal                Additional Data:      Lab Results: I have personally reviewed pertinent reports           Results from last 7 days  Lab Units 06/23/18  1257   WBC Thousand/uL 6 90   HEMOGLOBIN g/dL 12 7   HEMATOCRIT % 37 2   PLATELETS Thousands/uL 153   NEUTROS PCT % 79*   LYMPHS PCT % 9*   MONOS PCT % 11 EOS PCT % 0         Results from last 7 days  Lab Units 06/23/18  1257   SODIUM mmol/L 125*   POTASSIUM mmol/L 3 8   CHLORIDE mmol/L 93*   CO2 mmol/L 25   BUN mg/dL 5*   CREATININE mg/dL 0 70   CALCIUM mg/dL 9 0   GLUCOSE RANDOM mg/dL 109*                     Imaging: I have personally reviewed pertinent reports        US extremity soft tissue   Final Result by Byron Merrill (06/23 1355)             EKG, Pathology, and Other Studies Reviewed on Admission:   · EKG: Normal sinus rhythm     Allscripts / Epic Records Reviewed: Yes      ** Please Note: This note has been constructed using a voice recognition system   **

## 2018-06-23 NOTE — ASSESSMENT & PLAN NOTE
· Will continue treatment stated above  · Ultrasound did not show any abscess  If not much improved within the next 24 hr will consider consulting surgery    · Local wound care with neosporin

## 2018-06-24 NOTE — NURSING NOTE
Pt requested to sign out CESAR Echeverria spoke with patient at length regarding the consequences of leaving without further treatment  Pt stated that she understood and still wished to leave  She signed appropriate paperwork  Pt left with belongings   with patient  Pt walked to car    Norberto Urias RN

## 2018-06-24 NOTE — DISCHARGE SUMMARY
Discharge Summary - [facility] Internal Medicine    Patient Information: Jensen Frances 63 y o  female MRN: [id number]  Unit/Bed#: -02 Encounter: [id number]    Discharging Physician / Practitioner: Henreitta Lesches, CRNP  PCP: Seble Dean MD  Admission Date: 6/23/2018  Discharge Date: 06/23/18    Disposition:     Other: AMA     Reason for Admission: Infected insect bite on back at right shoulder blade  Discharge Diagnoses:     Principal Problem:    Sepsis ([address] )  Active Problems:    Anxiety    Chronic insomnia    Hyperlipidemia    Infected insect bite    Tobacco abuse  Resolved Problems:    * No resolved hospital problems  *      Consultations During Hospital Stay:  · none    Procedures Performed:     · none    Significant Findings / Test Results:     · unknown    Incidental Findings:   · unknown     Test Results Pending at Discharge (will require follow up):   · none     Outpatient Tests Requested:  · none    Complications:  unknown    Hospital Course:     Jensen Frances is a 63 y o  female patient who originally presented to the hospital on 6/23/2018 due to worsening infection of insect bite  Condition at Discharge: stable     Discharge Day Visit / Exam:     * Please refer to separate progress note for these details *    Discussion with Family: Discussed with patient that signing out AMA was not the best choice, at this point, the patient is leaving and states she will "be going directly to [facility], where she normally goes for care "      Discharge instructions/Information to patient and family:   See after visit summary for information provided to patient and family  Provisions for Follow-Up Care:  See after visit summary for information related to follow-up care and any pertinent home health orders  Planned Readmission: no    Discharge Statement:  I spent 30 minutes discharging the patient  This time was spent on the day of discharge   I had direct contact with the patient on the day of discharge  Greater than 50% of the total time was spent examining patient, answering all patient questions, discussing plan of care with patient  Discharge Medications:  Patient was informed to continue home medications and when reporting to other facility to tell them that she had received two antibiotics  ** Please Note: This note has been constructed using a voice recognition system  **   Patient did sign AMA paperwork

## 2018-06-28 NOTE — PROGRESS NOTES
Patient was admitted inpatient and signedout AMA on 6/23/18 to go to [facility]   Notes at [facility] reviewed, patient is on vanco  No further treatment from our hospital

## 2018-06-29 LAB
BACTERIA WND AEROBE CULT: ABNORMAL
GRAM STN SPEC: ABNORMAL

## 2018-07-03 ENCOUNTER — TELEPHONE (OUTPATIENT)
Dept: FAMILY MEDICINE CLINIC | Facility: CLINIC | Age: 58
End: 2018-07-03

## 2018-07-03 DIAGNOSIS — B37.3 VAGINAL CANDIDA: Primary | ICD-10-CM

## 2018-07-03 RX ORDER — FLUCONAZOLE 150 MG/1
150 TABLET ORAL ONCE
Qty: 1 TABLET | Refills: 0 | Status: SHIPPED | OUTPATIENT
Start: 2018-07-03 | End: 2018-07-03

## 2018-07-03 NOTE — TELEPHONE ENCOUNTER
Pt was seen here for a red sore  She took antibiotics that were given  Still didn't get any better so she went to [facility] Saturday and she said it was horrible there so she signed herself out and went to [facility] hospital  She was admitted and dx with lyme disease  She was kept overnight because she needed iv antibiotics  She was dc on Monday and is now down in [place] coming home later this weekend  She now has a yeast infection from the oral antibiotics shes still on from [facility]  Can you order diflucan to [facility]/[place] for her?

## 2018-07-11 ENCOUNTER — OFFICE VISIT (OUTPATIENT)
Dept: FAMILY MEDICINE CLINIC | Facility: CLINIC | Age: 58
End: 2018-07-11
Payer: COMMERCIAL

## 2018-07-11 VITALS
OXYGEN SATURATION: 97 % | HEIGHT: 64 IN | DIASTOLIC BLOOD PRESSURE: 76 MMHG | TEMPERATURE: 96.2 F | WEIGHT: 131.2 LBS | BODY MASS INDEX: 22.4 KG/M2 | HEART RATE: 69 BPM | SYSTOLIC BLOOD PRESSURE: 110 MMHG

## 2018-07-11 DIAGNOSIS — B37.3 VAGINAL YEAST INFECTION: ICD-10-CM
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
## 2018-07-11 DIAGNOSIS — A41.9 SEPSIS, DUE TO UNSPECIFIED ORGANISM: ICD-10-CM

## 2018-07-11 DIAGNOSIS — S20.211A CONTUSION OF RIGHT CHEST WALL, INITIAL ENCOUNTER: ICD-10-CM

## 2018-07-11 PROCEDURE — 3008F BODY MASS INDEX DOCD: CPT | Performed by: FAMILY MEDICINE

## 2018-07-11 PROCEDURE — 4004F PT TOBACCO SCREEN RCVD TLK: CPT | Performed by: FAMILY MEDICINE

## 2018-07-11 PROCEDURE — 99214 OFFICE O/P EST MOD 30 MIN: CPT | Performed by: FAMILY MEDICINE

## 2018-07-11 RX ORDER — FLUCONAZOLE 150 MG/1
150 TABLET ORAL ONCE
Qty: 1 TABLET | Refills: 0 | Status: SHIPPED | OUTPATIENT
Start: 2018-07-11 | End: 2018-07-11

## 2018-07-11 RX ORDER — CEFUROXIME AXETIL 500 MG/1
TABLET ORAL
COMMUNITY
Start: 2018-06-25 | End: 2018-12-18 | Stop reason: ALTCHOICE

## 2018-07-11 NOTE — PROGRESS NOTES
Assessment/Plan:       Diagnoses and all orders for this visit:    Bug bite with infection, subsequent encounter    Seps[facility], [facility] unspecified [facility] )    Contusion of right chest wall, initial encounter    Vaginal yeast infection  -     fluconazole (DIFLUCAN) 150 mg tablet; Take 1 tablet (150 mg total) by mouth once for 1 dose    Other orders  -     cefuroxime (CEFTIN) 500 mg tablet;         1  Sepsis/bite - on abx, resolving  F/u with I D   2  Contusion chest wall - Reassurance  This will resolve with time  Can use warm compresses and Tylenol as needed  3  Yeast infxn - diflucan after abx  Subjective:      Patient ID: Romayne Seamen is a 63 y o  female  She was seen in [facility] and left AMA, went to [facility]  She was seen by infectious disease  Was thought to have Lyme disease  She was admitted and treated with IV abx  She is now on cefuroxime  She was not treated with doxycycline because she states she was going to [place] and there  was concern about photosensitivity  So far Lyme serology has been negative  She has an infectious disease appointment this Friday  She was not aware that she had an appointment  She is concerned about bruising on her chest that started after they attempted to place a central line  She states they had difficulty placing the central line  She would also like a prescription for Diflucan to take after she completes antibiotics  She is susceptible to vaginal yeast infection  The following portions of the patient's history were reviewed and updated as appropriate: She  has a past medical history of Anxiety; Arthritis; Chronic fatigue syndrome; Chronic hepatitis C ([facility] ); Depression; Esophageal reflux; Fibromyalgia; Gallbladder disease; Glaucoma; Hyperlipidemia; and Insomnia    She   Patient Active Problem List    Diagnosis Date Noted    Contusion of right chest wall 07/11/2018    Sepsis ([facility] ) 06/23/2018     Class: Acute    Tobacco abuse 06/23/2018    Infected insect bite 06/21/2018    Environmental allergies 04/25/2018    Osteoporosis 09/12/2016    Chronic insomnia 05/19/2015    Recurrent cold sores 11/10/2014    Anxiety 05/12/2014    Neck pain 04/23/2014    Chronic fatigue syndrome 01/27/2014    Esophageal reflux 10/16/2013    Hepatitis C, chronic ([facility] ) 10/16/2013    Hyperlipidemia 07/11/2013    Fibromyalgia 08/27/2012    Glaucoma 08/27/2012    Arthritis 07/05/2012     She  has a past surgical history that includes Appendectomy; EAR SURGERY; Gastric bypass; Neuroplasty / transposition median nerve at carpal tunnel bilateral; Rotator cuff repair; and Cholecystectomy  Her family history includes Arthritis in her mother; Coronary artery disease in her family and mother; Heart disease in her mother; Hypertension in her mother; Mental illness in her father and mother  She  reports that she has been smoking Cigarettes  She has a 60 00 pack-year smoking history  She has never used smokeless tobacco  She reports that she drinks about 7 2 oz of alcohol per week   She reports that she does not use drugs  Current Outpatient Prescriptions   Medication Sig Dispense Refill    atorvastatin (LIPITOR) 10 mg tablet Take 1 tablet by mouth daily      cefuroxime (CEFTIN) 500 mg tablet       FLUoxetine (PROzac) 20 mg capsule Take 4 capsules (80 mg total) by mouth daily 360 capsule 3    fluticasone (FLONASE) 50 mcg/act nasal spray 2 sprays into each nostril daily      LUMIGAN 0 01 % ophthalmic drops Administer 1 drop to both eyes daily      oxyCODONE (ROXICODONE) 5 mg immediate release tablet Take 1 tablet by mouth 2 (two) times a day      traZODone (DESYREL) 150 mg tablet Take 1 tablet (150 mg total) by mouth daily at bedtime 90 tablet 3    fluconazole (DIFLUCAN) 150 mg tablet Take 1 tablet (150 mg total) by mouth once for 1 dose 1 tablet 0     No current facility-administered medications for this visit        Current Outpatient Prescriptions on File Prior to Visit   Medication Sig    atorvastatin (LIPITOR) 10 mg tablet Take 1 tablet by mouth daily    FLUoxetine (PROzac) 20 mg capsule Take 4 capsules (80 mg total) by mouth daily    fluticasone (FLONASE) 50 mcg/act nasal spray 2 sprays into each nostril daily    LUMIGAN 0 01 % ophthalmic drops Administer 1 drop to both eyes daily    oxyCODONE (ROXICODONE) 5 mg immediate release tablet Take 1 tablet by mouth 2 (two) times a day    traZODone (DESYREL) 150 mg tablet Take 1 tablet (150 mg total) by mouth daily at bedtime     No current facility-administered medications on file prior to visit  She is allergic to morphine and morphine and related       Review of Systems   Constitutional: Positive for fatigue  Negative for activity change  Respiratory: Negative for chest tightness and shortness of breath  Cardiovascular: Negative for chest pain and leg swelling  Skin:        Ecchymosis   Neurological: Negative for headaches  Psychiatric/Behavioral: Negative for dysphoric mood  The patient is not nervous/anxious  Objective:      /76   Pulse 69   Temp (!) 96 2 °F (35 7 °C)   Ht 5' 4" (1 626 m)   Wt 59 5 kg (131 lb 3 2 oz)   SpO2 97%   BMI 22 52 kg/m²          Physical Exam   Constitutional: She is oriented to person, place, and time  She appears well-developed and well-nourished  No distress  HENT:   Head: Normocephalic and atraumatic  Eyes: Conjunctivae are normal  Pupils are equal, round, and reactive to light  Neck: Neck supple  Cardiovascular: Normal rate, regular rhythm and normal heart sounds  Exam reveals no gallop and no friction rub  No murmur heard  Pulmonary/Chest: Effort normal and breath sounds normal  No respiratory distress  She has no wheezes  She has no rales  She exhibits no tenderness  Musculoskeletal: Normal range of motion  She exhibits no edema  Neurological: She is alert and oriented to person, place, and time     Skin: Skin is warm and dry  No rash noted  She is not diaphoretic  Psychiatric: She has a normal mood and affect  Her behavior is normal  Judgment and thought content normal    Nursing note and vitals reviewed

## 2018-08-10 ENCOUNTER — TRANSCRIBE ORDERS (OUTPATIENT)
Dept: ADMINISTRATIVE | Facility: HOSPITAL | Age: 58
End: 2018-08-10

## 2018-08-10 ENCOUNTER — APPOINTMENT (OUTPATIENT)
Dept: LAB | Facility: CLINIC | Age: 58
End: 2018-08-10
Payer: COMMERCIAL

## 2018-08-10 DIAGNOSIS — D70.9 EOSINOPENIA (HCC): ICD-10-CM

## 2018-08-10 DIAGNOSIS — A69.20 LYME DISEASE: ICD-10-CM

## 2018-08-10 DIAGNOSIS — D69.6 THROMBOCYTOPENIA, UNSPECIFIED (HCC): ICD-10-CM

## 2018-08-10 DIAGNOSIS — D70.9 EOSINOPENIA (HCC): Primary | ICD-10-CM

## 2018-08-10 LAB
BASOPHILS # BLD AUTO: 0.07 THOUSANDS/ΜL (ref 0–0.1)
BASOPHILS NFR BLD AUTO: 1 % (ref 0–1)
EOSINOPHIL # BLD AUTO: 0.19 THOUSAND/ΜL (ref 0–0.61)
EOSINOPHIL NFR BLD AUTO: 3 % (ref 0–6)
ERYTHROCYTE [DISTWIDTH] IN BLOOD BY AUTOMATED COUNT: 14 % (ref 11.6–15.1)
HCT VFR BLD AUTO: 38.9 % (ref 34.8–46.1)
HGB BLD-MCNC: 12.5 G/DL (ref 11.5–15.4)
IMM GRANULOCYTES # BLD AUTO: 0.01 THOUSAND/UL (ref 0–0.2)
IMM GRANULOCYTES NFR BLD AUTO: 0 % (ref 0–2)
LYMPHOCYTES # BLD AUTO: 1.13 THOUSANDS/ΜL (ref 0.6–4.47)
LYMPHOCYTES NFR BLD AUTO: 18 % (ref 14–44)
MCH RBC QN AUTO: 30.3 PG (ref 26.8–34.3)
MCHC RBC AUTO-ENTMCNC: 32.1 G/DL (ref 31.4–37.4)
MCV RBC AUTO: 94 FL (ref 82–98)
MONOCYTES # BLD AUTO: 0.38 THOUSAND/ΜL (ref 0.17–1.22)
MONOCYTES NFR BLD AUTO: 6 % (ref 4–12)
NEUTROPHILS # BLD AUTO: 4.65 THOUSANDS/ΜL (ref 1.85–7.62)
NEUTS SEG NFR BLD AUTO: 72 % (ref 43–75)
NRBC BLD AUTO-RTO: 0 /100 WBCS
PLATELET # BLD AUTO: 193 THOUSANDS/UL (ref 149–390)
PMV BLD AUTO: 11.5 FL (ref 8.9–12.7)
RBC # BLD AUTO: 4.13 MILLION/UL (ref 3.81–5.12)
WBC # BLD AUTO: 6.43 THOUSAND/UL (ref 4.31–10.16)

## 2018-08-10 PROCEDURE — 86617 LYME DISEASE ANTIBODY: CPT

## 2018-08-10 PROCEDURE — 86618 LYME DISEASE ANTIBODY: CPT

## 2018-08-10 PROCEDURE — 85025 COMPLETE CBC W/AUTO DIFF WBC: CPT

## 2018-08-10 PROCEDURE — 36415 COLL VENOUS BLD VENIPUNCTURE: CPT

## 2018-08-12 LAB
B BURGDOR IGG SER IA-ACNC: 0.27
B BURGDOR IGM SER IA-ACNC: 1.71

## 2018-08-15 LAB
B BURGDOR IGG PATRN SER IB-IMP: NEGATIVE
B BURGDOR IGM PATRN SER IB-IMP: NEGATIVE
B BURGDOR18KD IGG SER QL IB: NORMAL
B BURGDOR23KD IGG SER QL IB: NORMAL
B BURGDOR23KD IGM SER QL IB: NORMAL
B BURGDOR28KD IGG SER QL IB: NORMAL
B BURGDOR30KD IGG SER QL IB: NORMAL
B BURGDOR39KD IGG SER QL IB: NORMAL
B BURGDOR39KD IGM SER QL IB: NORMAL
B BURGDOR41KD IGG SER QL IB: NORMAL
B BURGDOR41KD IGM SER QL IB: NORMAL
B BURGDOR45KD IGG SER QL IB: NORMAL
B BURGDOR58KD IGG SER QL IB: NORMAL
B BURGDOR66KD IGG SER QL IB: NORMAL
B BURGDOR93KD IGG SER QL IB: NORMAL
MISCELLANEOUS LAB TEST RESULT: NORMAL

## 2018-09-25 DIAGNOSIS — E78.2 HYPERLIPIDEMIA, MIXED: Primary | ICD-10-CM

## 2018-09-25 RX ORDER — ATORVASTATIN CALCIUM 10 MG/1
10 TABLET, FILM COATED ORAL DAILY
Qty: 90 TABLET | Refills: 1 | Status: SHIPPED | OUTPATIENT
Start: 2018-09-25 | End: 2019-04-18 | Stop reason: SDUPTHER

## 2018-09-25 NOTE — TELEPHONE ENCOUNTER
Needs yearly letter to get her emotional support dog on the plane    You did one last year for her and it is only good for one year!!   Give to Juan Waldron to get to patient

## 2018-10-11 ENCOUNTER — TRANSCRIBE ORDERS (OUTPATIENT)
Dept: ADMINISTRATIVE | Facility: HOSPITAL | Age: 58
End: 2018-10-11

## 2018-10-11 ENCOUNTER — APPOINTMENT (OUTPATIENT)
Dept: LAB | Facility: CLINIC | Age: 58
End: 2018-10-11
Payer: COMMERCIAL

## 2018-10-11 DIAGNOSIS — B18.2 CHRONIC HEPATITIS C WITH HEPATIC COMA (HCC): Primary | ICD-10-CM

## 2018-10-11 DIAGNOSIS — B18.2 CHRONIC HEPATITIS C WITH HEPATIC COMA (HCC): ICD-10-CM

## 2018-10-11 PROCEDURE — 87522 HEPATITIS C REVRS TRNSCRPJ: CPT

## 2018-10-11 PROCEDURE — 36415 COLL VENOUS BLD VENIPUNCTURE: CPT

## 2018-10-16 LAB
HCV RNA SERPL NAA+PROBE-ACNC: NORMAL IU/ML
TEST INFORMATION: NORMAL

## 2018-11-07 ENCOUNTER — APPOINTMENT (OUTPATIENT)
Dept: RADIOLOGY | Facility: CLINIC | Age: 58
End: 2018-11-07
Payer: COMMERCIAL

## 2018-11-07 ENCOUNTER — TRANSCRIBE ORDERS (OUTPATIENT)
Dept: RADIOLOGY | Facility: CLINIC | Age: 58
End: 2018-11-07

## 2018-11-07 DIAGNOSIS — R07.81 RIB PAIN ON LEFT SIDE: Primary | ICD-10-CM

## 2018-11-07 DIAGNOSIS — R07.81 RIB PAIN ON LEFT SIDE: ICD-10-CM

## 2018-11-07 PROCEDURE — 71101 X-RAY EXAM UNILAT RIBS/CHEST: CPT

## 2018-11-12 ENCOUNTER — TELEPHONE (OUTPATIENT)
Dept: FAMILY MEDICINE CLINIC | Facility: CLINIC | Age: 58
End: 2018-11-12

## 2018-11-12 NOTE — TELEPHONE ENCOUNTER
She is on her way to [place]   She has a sinus infection and would like a z pack and diflucan called in to a pharmacy in [place]     Please call patient [phone]    She will give a pharmacy then

## 2018-11-29 ENCOUNTER — TELEPHONE (OUTPATIENT)
Dept: FAMILY MEDICINE CLINIC | Facility: CLINIC | Age: 58
End: 2018-11-29

## 2018-11-29 NOTE — TELEPHONE ENCOUNTER
She called to schedule her injection  Please follow up with her to schedule an appointment  Injection in the nurses station and we are currently out       Thank you

## 2018-12-10 ENCOUNTER — TELEPHONE (OUTPATIENT)
Dept: FAMILY MEDICINE CLINIC | Facility: CLINIC | Age: 58
End: 2018-12-10

## 2018-12-10 NOTE — TELEPHONE ENCOUNTER
Pt calls questioning the status of the peer to peer review for her Prolia  Did you call them  What to do next?

## 2018-12-11 DIAGNOSIS — M81.0 AGE-RELATED OSTEOPOROSIS WITHOUT CURRENT PATHOLOGICAL FRACTURE: Primary | ICD-10-CM

## 2018-12-11 RX ORDER — ALENDRONATE SODIUM 70 MG/1
70 TABLET ORAL
Qty: 12 TABLET | Refills: 0 | Status: SHIPPED | OUTPATIENT
Start: 2018-12-11 | End: 2018-12-18 | Stop reason: ALTCHOICE

## 2018-12-11 RX ORDER — ALENDRONATE SODIUM 70 MG/1
70 TABLET ORAL
Qty: 12 TABLET | Refills: 3 | Status: SHIPPED | OUTPATIENT
Start: 2018-12-11 | End: 2018-12-11 | Stop reason: SDUPTHER

## 2018-12-11 NOTE — TELEPHONE ENCOUNTER
Pt has never used an oral medication  Please erx to [facility] in [address]  Also order the dexa he will set it up and do that as well     Thank you

## 2018-12-11 NOTE — TELEPHONE ENCOUNTER
Mustapha FRANKLIN:  I spoke to a representative  She stated that she has to have a T score of -2 5 (which she did have on DEXA in 2016) AND also have tried and failed a Bisphosphonate like Fosamax  Please find out if she ever took a pill for her osteoporosis like Fosamax  Also I will order a new DEXA since it has been > 2 years since her last one  Please fax the DEXA report from 2016 to them  Papers on my desk    Case # [id number], Patient's member ID,

## 2018-12-11 NOTE — TELEPHONE ENCOUNTER
Merline Roller I accidentally sent ti Alicia Benavidez first can you please cancel  DEXA ordered   Fosmax at [facility]

## 2018-12-17 ENCOUNTER — HOSPITAL ENCOUNTER (OUTPATIENT)
Dept: BONE DENSITY | Facility: HOSPITAL | Age: 58
Discharge: HOME | End: 2018-12-17
Payer: COMMERCIAL

## 2018-12-17 DIAGNOSIS — M81.0 AGE-RELATED OSTEOPOROSIS WITHOUT CURRENT PATHOLOGICAL FRACTURE: ICD-10-CM

## 2018-12-17 PROCEDURE — 77080 DXA BONE DENSITY AXIAL: CPT

## 2018-12-18 ENCOUNTER — OFFICE VISIT (OUTPATIENT)
Dept: FAMILY MEDICINE CLINIC | Facility: CLINIC | Age: 58
End: 2018-12-18
Payer: COMMERCIAL

## 2018-12-18 VITALS
BODY MASS INDEX: 21.48 KG/M2 | RESPIRATION RATE: 16 BRPM | DIASTOLIC BLOOD PRESSURE: 78 MMHG | HEIGHT: 64 IN | HEART RATE: 72 BPM | WEIGHT: 125.8 LBS | OXYGEN SATURATION: 97 % | SYSTOLIC BLOOD PRESSURE: 128 MMHG | TEMPERATURE: 97.7 F

## 2018-12-18 DIAGNOSIS — F33.2 SEVERE EPISODE OF RECURRENT MAJOR DEPRESSIVE DISORDER, WITHOUT PSYCHOTIC FEATURES (HCC): Primary | ICD-10-CM

## 2018-12-18 DIAGNOSIS — F10.10 ALCOHOL ABUSE: ICD-10-CM

## 2018-12-18 PROBLEM — S20.211A CONTUSION OF RIGHT CHEST WALL: Status: RESOLVED | Noted: 2018-07-11 | Resolved: 2018-12-18
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
## 2018-12-18 PROBLEM — A41.9 SEPSIS (HCC): Status: RESOLVED | Noted: 2018-06-23 | Resolved: 2018-12-18

## 2018-12-18 PROCEDURE — 99213 OFFICE O/P EST LOW 20 MIN: CPT | Performed by: NURSE PRACTITIONER

## 2018-12-18 PROCEDURE — 3008F BODY MASS INDEX DOCD: CPT | Performed by: NURSE PRACTITIONER

## 2018-12-18 RX ORDER — BUPROPION HYDROCHLORIDE 150 MG/1
150 TABLET ORAL DAILY
Qty: 30 TABLET | Refills: 0 | Status: SHIPPED | OUTPATIENT
Start: 2018-12-18 | End: 2019-04-18 | Stop reason: SDUPTHER

## 2018-12-18 NOTE — PROGRESS NOTES
Assessment/Plan:    No problem-specific Assessment & Plan notes found for this encounter  Diagnoses and all orders for this visit:    Severe episode of recurrent major depressive disorder, without psychotic features ([facility] )  Comments: Will refer to inpatient rehab information provided and also offered therapy   Orders:  -     buPROPion (WELLBUTRIN XL) 150 mg 24 hr tablet; Take 1 tablet (150 mg total) by mouth daily for 30 days    Alcohol abuse  Comments:  discussed rehab and information provided   Orders:  -     buPROPion (WELLBUTRIN XL) 150 mg 24 hr tablet; Take 1 tablet (150 mg total) by mouth daily for 30 days          Subjective:      Patient ID: Gayle Reyes is a 63 y o  female  Patient here and reports that she is in the process of going through divorce of 28 years of marriage and has to sell her home and her father passed away  Patient admits to drinking and has been drinking every few days and has a hard time with stopping her alcohol intake  Patient reports that she has lost a lot of weight and has looked into inpatient rehab  Patient is interested in pursuing inpatient rehab  Patient is currently taking Prozac 80 mg and not helping and has been depressed most of her lift  Patient has not been inpatient in the past and does have drug use history and her children have stopped talking to her  PHQ-9 score 18  Patient reports that she is open to attending rehab for her alcohol  Patient is taking sleeping medication and does help  Patient also is not in therapy and interested in attending therapy  Patient follows with [facility] for her pain management  Patient is planning to travel to [place] and needs to have a form completed for her flight with her dog to travel to [place]         The following portions of the patient's history were reviewed and updated as appropriate:   She  has a past medical history of Anxiety;  Arthritis; Chronic fatigue syndrome; Chronic hepatitis C ([place] [id number] ); Depression; Esophageal reflux; Fibromyalgia; Gallbladder disease; Glaucoma; Hyperlipidemia; and Insomnia  She   Patient Active Problem List    Diagnosis Date Noted    Alcohol abuse 12/18/2018    Severe episode of recurrent major depressive disorder, without psychotic features ([place] [address] ) 12/18/2018    Tobacco abuse 06/23/2018    Environmental allergies 04/25/2018    Osteoporosis 09/12/2016    Chronic insomnia 05/19/2015    Recurrent cold sores 11/10/2014    Anxiety 05/12/2014    Neck pain 04/23/2014    Chronic fatigue syndrome 01/27/2014    Esophageal reflux 10/16/2013    Hepatitis C, chronic ([place] [address] ) 10/16/2013    Hyperlipidemia 07/11/2013    Fibromyalgia 08/27/2012    Glaucoma 08/27/2012    Arthritis 07/05/2012     She  has a past surgical history that includes Appendectomy; EAR SURGERY; Gastric bypass; Neuroplasty / transposition median nerve at carpal tunnel bilateral; Rotator cuff repair; and Cholecystectomy  Her family history includes Arthritis in her mother; Coronary artery disease in her family and mother; Heart disease in her mother; Hypertension in her mother; Mental illness in her father and mother  She  reports that she has been smoking Cigarettes  She has a 60 00 pack-year smoking history  She has never used smokeless tobacco  She reports that she drinks about 7 2 oz of alcohol per week   She reports that she does not use drugs  She is allergic to morphine and morphine and related       Review of Systems   Constitutional: Negative for activity change, appetite change, chills, diaphoresis, fatigue, fever and unexpected weight change  HENT: Negative for congestion, ear pain, hearing loss, postnasal drip, sinus pain, sinus pressure, sneezing and sore throat  Eyes: Negative for pain, redness and visual disturbance  Respiratory: Negative for cough and shortness of breath  Cardiovascular: Negative for chest pain and leg swelling     Gastrointestinal: Negative for abdominal pain, diarrhea, nausea and vomiting  Musculoskeletal: Negative for arthralgias  Neurological: Negative for dizziness and light-headedness  Psychiatric/Behavioral: Positive for dysphoric mood  The patient is nervous/anxious  Objective:      /78 (BP Location: Left arm, Patient Position: Sitting, Cuff Size: Adult)   Pulse 72   Temp 97 7 °F (36 5 °C) (Tympanic)   Resp 16   Ht 5' 4" (1 626 m)   Wt 57 1 kg (125 lb 12 8 oz)   SpO2 97%   BMI 21 59 kg/m²          Physical Exam   Constitutional: She is oriented to person, place, and time  Vital signs are normal  She appears well-developed and well-nourished  No distress  HENT:   Head: Normocephalic and atraumatic  Eyes: Pupils are equal, round, and reactive to light  Neck: Normal range of motion  No thyromegaly present  Cardiovascular: Normal rate, regular rhythm, normal heart sounds and intact distal pulses  No murmur heard  Pulmonary/Chest: Effort normal and breath sounds normal  No respiratory distress  She has no wheezes  Abdominal: Soft  Bowel sounds are normal    Musculoskeletal: Normal range of motion  Neurological: She is alert and oriented to person, place, and time  Skin: Skin is warm and dry  Psychiatric: Her behavior is normal  Judgment and thought content normal  Her mood appears anxious  Her speech is rapid and/or pressured  Cognition and memory are normal  She exhibits a depressed mood     PHQ-9 score is 16

## 2019-04-18 ENCOUNTER — OFFICE VISIT (OUTPATIENT)
Dept: FAMILY MEDICINE CLINIC | Facility: CLINIC | Age: 59
End: 2019-04-18
Payer: COMMERCIAL

## 2019-04-18 VITALS
OXYGEN SATURATION: 98 % | WEIGHT: 135 LBS | HEIGHT: 64 IN | SYSTOLIC BLOOD PRESSURE: 106 MMHG | HEART RATE: 70 BPM | TEMPERATURE: 97 F | DIASTOLIC BLOOD PRESSURE: 70 MMHG | BODY MASS INDEX: 23.05 KG/M2

## 2019-04-18 DIAGNOSIS — F10.21 ALCOHOLISM IN RECOVERY (HCC): Primary | ICD-10-CM

## 2019-04-18 DIAGNOSIS — Z92.89 HISTORY OF SCREENING MAMMOGRAPHY: ICD-10-CM

## 2019-04-18 DIAGNOSIS — M81.0 OSTEOPOROSIS, UNSPECIFIED OSTEOPOROSIS TYPE, UNSPECIFIED PATHOLOGICAL FRACTURE PRESENCE: ICD-10-CM

## 2019-04-18 DIAGNOSIS — F10.10 ALCOHOL ABUSE: ICD-10-CM

## 2019-04-18 DIAGNOSIS — F33.2 SEVERE EPISODE OF RECURRENT MAJOR DEPRESSIVE DISORDER, WITHOUT PSYCHOTIC FEATURES (HCC): ICD-10-CM

## 2019-04-18 DIAGNOSIS — M54.2 NECK PAIN: ICD-10-CM

## 2019-04-18 DIAGNOSIS — F41.9 ANXIETY: ICD-10-CM

## 2019-04-18 DIAGNOSIS — E78.2 HYPERLIPIDEMIA, MIXED: ICD-10-CM

## 2019-04-18 DIAGNOSIS — J30.1 SEASONAL ALLERGIC RHINITIS DUE TO POLLEN: ICD-10-CM

## 2019-04-18 DIAGNOSIS — M19.90 ARTHRITIS: ICD-10-CM

## 2019-04-18 DIAGNOSIS — F51.04 CHRONIC INSOMNIA: ICD-10-CM

## 2019-04-18 DIAGNOSIS — E78.5 HYPERLIPIDEMIA, UNSPECIFIED HYPERLIPIDEMIA TYPE: ICD-10-CM

## 2019-04-18 DIAGNOSIS — H40.9 GLAUCOMA OF BOTH EYES, UNSPECIFIED GLAUCOMA TYPE: ICD-10-CM

## 2019-04-18 DIAGNOSIS — Z72.0 TOBACCO ABUSE: Chronic | ICD-10-CM

## 2019-04-18 PROBLEM — Z91.09 ENVIRONMENTAL ALLERGIES: Status: RESOLVED | Noted: 2018-04-25 | Resolved: 2019-04-18

## 2019-04-18 PROCEDURE — 99214 OFFICE O/P EST MOD 30 MIN: CPT | Performed by: NURSE PRACTITIONER

## 2019-04-18 RX ORDER — BUPROPION HYDROCHLORIDE 150 MG/1
150 TABLET ORAL DAILY
Qty: 90 TABLET | Refills: 3 | Status: SHIPPED | OUTPATIENT
Start: 2019-04-18 | End: 2019-04-18 | Stop reason: ALTCHOICE

## 2019-04-18 RX ORDER — ALENDRONATE SODIUM 70 MG/1
70 TABLET ORAL
Qty: 12 TABLET | Refills: 3 | Status: SHIPPED | OUTPATIENT
Start: 2019-04-18 | End: 2021-08-04

## 2019-04-18 RX ORDER — ATORVASTATIN CALCIUM 10 MG/1
10 TABLET, FILM COATED ORAL DAILY
Qty: 90 TABLET | Refills: 3 | Status: SHIPPED | OUTPATIENT
Start: 2019-04-18 | End: 2021-08-04

## 2019-04-18 RX ORDER — TRAZODONE HYDROCHLORIDE 150 MG/1
150 TABLET ORAL
Qty: 90 TABLET | Refills: 3 | Status: SHIPPED | OUTPATIENT
Start: 2019-04-18

## 2019-04-18 RX ORDER — FLUTICASONE PROPIONATE 50 MCG
2 SPRAY, SUSPENSION (ML) NASAL DAILY
Qty: 3 BOTTLE | Refills: 3 | Status: SHIPPED | OUTPATIENT
Start: 2019-04-18 | End: 2019-04-29 | Stop reason: ALTCHOICE

## 2019-04-18 RX ORDER — FLUOXETINE HYDROCHLORIDE 20 MG/1
80 CAPSULE ORAL DAILY
Qty: 360 CAPSULE | Refills: 3 | Status: SHIPPED | OUTPATIENT
Start: 2019-04-18 | End: 2021-08-04

## 2019-04-18 RX ORDER — BUPROPION HYDROCHLORIDE 75 MG/1
75 TABLET ORAL 2 TIMES DAILY
Qty: 180 TABLET | Refills: 3 | Status: SHIPPED | OUTPATIENT
Start: 2019-04-18 | End: 2019-12-27 | Stop reason: ALTCHOICE

## 2019-04-19 ENCOUNTER — TELEPHONE (OUTPATIENT)
Dept: FAMILY MEDICINE CLINIC | Facility: CLINIC | Age: 59
End: 2019-04-19

## 2019-04-19 DIAGNOSIS — J30.1 SEASONAL ALLERGIC RHINITIS DUE TO POLLEN: Primary | ICD-10-CM

## 2019-04-19 RX ORDER — MOMETASONE FUROATE 50 UG/1
2 SPRAY, METERED NASAL DAILY
Qty: 17 G | Refills: 1 | Status: SHIPPED | OUTPATIENT
Start: 2019-04-19 | End: 2019-04-29 | Stop reason: ALTCHOICE

## 2019-04-29 ENCOUNTER — OFFICE VISIT (OUTPATIENT)
Dept: FAMILY MEDICINE CLINIC | Facility: CLINIC | Age: 59
End: 2019-04-29
Payer: COMMERCIAL

## 2019-04-29 ENCOUNTER — OFFICE VISIT (OUTPATIENT)
Dept: URGENT CARE | Facility: CLINIC | Age: 59
End: 2019-04-29
Payer: COMMERCIAL

## 2019-04-29 VITALS
SYSTOLIC BLOOD PRESSURE: 130 MMHG | DIASTOLIC BLOOD PRESSURE: 72 MMHG | OXYGEN SATURATION: 97 % | HEART RATE: 88 BPM | TEMPERATURE: 97.1 F | WEIGHT: 132.4 LBS | BODY MASS INDEX: 22.61 KG/M2 | RESPIRATION RATE: 16 BRPM | HEIGHT: 64 IN

## 2019-04-29 VITALS
OXYGEN SATURATION: 96 % | BODY MASS INDEX: 22.2 KG/M2 | SYSTOLIC BLOOD PRESSURE: 110 MMHG | DIASTOLIC BLOOD PRESSURE: 64 MMHG | TEMPERATURE: 97.2 F | WEIGHT: 130 LBS | HEART RATE: 76 BPM | HEIGHT: 64 IN | RESPIRATION RATE: 20 BRPM

## 2019-04-29 DIAGNOSIS — B37.3 VAGINAL CANDIDIASIS: ICD-10-CM

## 2019-04-29 DIAGNOSIS — J20.9 ACUTE BRONCHITIS, UNSPECIFIED ORGANISM: Primary | ICD-10-CM

## 2019-04-29 DIAGNOSIS — J06.9 UPPER RESPIRATORY INFECTION, VIRAL: Primary | ICD-10-CM

## 2019-04-29 PROBLEM — B37.31 VAGINAL CANDIDIASIS: Status: ACTIVE | Noted: 2019-04-29

## 2019-04-29 PROCEDURE — 3008F BODY MASS INDEX DOCD: CPT | Performed by: NURSE PRACTITIONER

## 2019-04-29 PROCEDURE — S9083 URGENT CARE CENTER GLOBAL: HCPCS | Performed by: NURSE PRACTITIONER

## 2019-04-29 PROCEDURE — G0382 LEV 3 HOSP TYPE B ED VISIT: HCPCS | Performed by: NURSE PRACTITIONER

## 2019-04-29 PROCEDURE — 4004F PT TOBACCO SCREEN RCVD TLK: CPT | Performed by: NURSE PRACTITIONER

## 2019-04-29 PROCEDURE — 99213 OFFICE O/P EST LOW 20 MIN: CPT | Performed by: NURSE PRACTITIONER

## 2019-04-29 RX ORDER — FLUCONAZOLE 150 MG/1
150 TABLET ORAL ONCE
Qty: 1 TABLET | Refills: 0 | Status: SHIPPED | OUTPATIENT
Start: 2019-04-29 | End: 2019-04-29

## 2019-04-29 RX ORDER — AZITHROMYCIN 250 MG/1
TABLET, FILM COATED ORAL
Qty: 6 TABLET | Refills: 0 | Status: SHIPPED | OUTPATIENT
Start: 2019-04-29 | End: 2019-05-03

## 2019-05-15 ENCOUNTER — APPOINTMENT (OUTPATIENT)
Dept: LAB | Facility: CLINIC | Age: 59
End: 2019-05-15
Payer: COMMERCIAL

## 2019-05-15 DIAGNOSIS — E78.5 HYPERLIPIDEMIA, UNSPECIFIED HYPERLIPIDEMIA TYPE: ICD-10-CM

## 2019-05-15 DIAGNOSIS — R79.89 ELEVATED LIVER FUNCTION TESTS: Primary | ICD-10-CM

## 2019-05-15 LAB
ALBUMIN SERPL BCP-MCNC: 3.6 G/DL (ref 3.5–5)
ALP SERPL-CCNC: 137 U/L (ref 46–116)
ALT SERPL W P-5'-P-CCNC: 59 U/L (ref 12–78)
ANION GAP SERPL CALCULATED.3IONS-SCNC: 5 MMOL/L (ref 4–13)
AST SERPL W P-5'-P-CCNC: 48 U/L (ref 5–45)
BILIRUB SERPL-MCNC: 0.27 MG/DL (ref 0.2–1)
BUN SERPL-MCNC: 6 MG/DL (ref 5–25)
CALCIUM SERPL-MCNC: 8.9 MG/DL (ref 8.3–10.1)
CHLORIDE SERPL-SCNC: 104 MMOL/L (ref 100–108)
CHOLEST SERPL-MCNC: 138 MG/DL (ref 50–200)
CO2 SERPL-SCNC: 29 MMOL/L (ref 21–32)
CREAT SERPL-MCNC: 0.74 MG/DL (ref 0.6–1.3)
GFR SERPL CREATININE-BSD FRML MDRD: 90 ML/MIN/1.73SQ M
GLUCOSE P FAST SERPL-MCNC: 86 MG/DL (ref 65–99)
HDLC SERPL-MCNC: 72 MG/DL (ref 40–60)
LDLC SERPL CALC-MCNC: 55 MG/DL (ref 0–100)
POTASSIUM SERPL-SCNC: 4.6 MMOL/L (ref 3.5–5.3)
PROT SERPL-MCNC: 7.4 G/DL (ref 6.4–8.2)
SODIUM SERPL-SCNC: 138 MMOL/L (ref 136–145)
TRIGL SERPL-MCNC: 56 MG/DL

## 2019-05-15 PROCEDURE — 36415 COLL VENOUS BLD VENIPUNCTURE: CPT

## 2019-05-15 PROCEDURE — 80061 LIPID PANEL: CPT

## 2019-05-15 PROCEDURE — 80053 COMPREHEN METABOLIC PANEL: CPT

## 2019-07-18 ENCOUNTER — OFFICE VISIT (OUTPATIENT)
Dept: URGENT CARE | Facility: CLINIC | Age: 59
End: 2019-07-18
Payer: COMMERCIAL

## 2019-07-18 VITALS
RESPIRATION RATE: 16 BRPM | HEART RATE: 65 BPM | DIASTOLIC BLOOD PRESSURE: 60 MMHG | OXYGEN SATURATION: 99 % | BODY MASS INDEX: 22.53 KG/M2 | HEIGHT: 64 IN | WEIGHT: 132 LBS | TEMPERATURE: 97 F | SYSTOLIC BLOOD PRESSURE: 130 MMHG
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
## 2019-07-18 DIAGNOSIS — S20.361A TICK BITE OF RIGHT SIDE OF CHEST WALL, INITIAL ENCOUNTER: Primary | ICD-10-CM

## 2019-07-18 PROCEDURE — 99213 OFFICE O/P EST LOW 20 MIN: CPT | Performed by: PHYSICIAN ASSISTANT

## 2019-07-18 RX ORDER — FLUTICASONE PROPIONATE 50 MCG
2 SPRAY, SUSPENSION (ML) NASAL
COMMUNITY
Start: 2017-11-17 | End: 2021-08-04 | Stop reason: ALTCHOICE

## 2019-07-18 RX ORDER — DOXYCYCLINE 100 MG/1
TABLET ORAL
Qty: 2 TABLET | Refills: 0 | Status: SHIPPED | OUTPATIENT
Start: 2019-07-18 | End: 2019-07-18

## 2019-07-18 RX ORDER — LATANOPROST 50 UG/ML
SOLUTION/ DROPS OPHTHALMIC
COMMUNITY
Start: 2019-05-07 | End: 2021-08-04 | Stop reason: ALTCHOICE

## 2019-07-18 RX ORDER — BUPROPION HYDROCHLORIDE 200 MG/1
200 TABLET, EXTENDED RELEASE ORAL DAILY
Refills: 1 | COMMUNITY
Start: 2019-05-28 | End: 2019-12-27 | Stop reason: ALTCHOICE

## 2019-07-18 NOTE — PROGRESS NOTES
[facility]        NAME: Russell Chaudhary is a 63 y o  female  : 1960    MRN: [id number]  DATE: 2019  TIME: 12:21 PM    Assessment and Plan   Tick bite of right side of chest wall, initial encounter [S20 361A, W57  XXXA]  1  Tick bite of right side of chest wall, initial encounter  doxycycline (ADOXA) 100 MG tablet         Patient Instructions       Follow up with PCP in 3-5 days  Proceed to  ER if symptoms worsen  Chief Complaint     Chief Complaint   Patient presents with    Tick Bite         History of Present Illness       Patient was working in a wood pile yesterday and noticed a tick this morning on her right side of her chest   Patient was hospitalized with Lyme disease last year  Denies any fever chills rashes headaches paresthesias  Review of Systems   Review of Systems   Constitutional: Negative for chills and fever  Cardiovascular: Negative for palpitations  Musculoskeletal: Negative for arthralgias and myalgias  Skin: Negative for rash  Neurological: Negative for headaches  Hematological: Negative for adenopathy           Current Medications       Current Outpatient Medications:     alendronate (FOSAMAX) 70 mg tablet, Take 1 tablet (70 mg total) by mouth every 7 days, Disp: 12 tablet, Rfl: 3    atorvastatin (LIPITOR) 10 mg tablet, Take 1 tablet (10 mg total) by mouth daily, Disp: 90 tablet, Rfl: 3    Brexpiprazole (REXULTI PO), Take by mouth, Disp: , Rfl:     FLUoxetine (PROzac) 20 mg capsule, Take 4 capsules (80 mg total) by mouth daily, Disp: 360 capsule, Rfl: 3    latanoprost (XALATAN) 0 005 % ophthalmic solution, , Disp: , Rfl:     oxyCODONE (ROXICODONE) 5 mg immediate release tablet, Take 1 tablet by mouth 2 (two) times a day, Disp: , Rfl:     traZODone (DESYREL) 150 mg tablet, Take 1 tablet (150 mg total) by mouth daily at bedtime, Disp: 90 tablet, Rfl: 3    buPROPion (WELLBUTRIN SR) 200 MG 12 hr tablet, Take 200 mg by mouth daily, Disp: , Rfl: 1    buPROPion (WELLBUTRIN) 75 mg tablet, Take 1 tablet (75 mg total) by mouth 2 (two) times a day (Patient not taking: Reported on 7/18/2019), Disp: 180 tablet, Rfl: 3    doxycycline (ADOXA) 100 MG tablet, Take 2 tablets as 1 oral dose, Disp: 2 tablet, Rfl: 0    fluticasone (FLONASE) 50 mcg/act nasal spray, 2 sprays into each nostril, Disp: , Rfl:     Current Allergies     Allergies as of 07/18/2019 - Reviewed 07/18/2019   Allergen Reaction Noted    Morphine  05/09/2016    Morphine and related  05/08/2012            The following portions of the patient's history were reviewed and updated as appropriate: allergies, current medications, past family history, past medical history, past social history, past surgical history and problem list      Past Medical History:   Diagnosis Date    Allergic     Anxiety     last assessed 11/17/17    Arthritis     Chronic fatigue syndrome     last assessed 2/25/14    Chronic [facility] C ([facility] )     last assessed 4/12/17    Depression     Esophageal reflux     last assessed 10/16/13    Fibromyalgia     Gallbladder disease     Glaucoma     Hyperlipidemia     last assessed 4/12/17    Insomnia        Past Surgical History:   Procedure Laterality Date    APPENDECTOMY      resolved 8/2000    CHOLECYSTECTOMY      EAR SURGERY      resolved 4/5/00    GASTRIC BYPASS      resolved 7/06    NEUROPLASTY / TRANSPOSITION MEDIAN NERVE AT CARPAL TUNNEL BILATERAL      resolved 10/11    ROTATOR CUFF REPAIR         Family History   Problem Relation Age of Onset    Arthritis Mother     Coronary artery disease Mother     Mental illness Mother     Heart disease Mother     Hypertension Mother     Mental illness Father     Coronary artery disease Family          Medications have been verified          Objective   /60   Pulse 65   Temp (!) 97 °F (36 1 °C)   Resp 16   Ht 5' 4" (1 626 m)   Wt 59 9 kg (132 lb)   SpO2 99%   BMI 22 66 kg/m²          Physical Exam Physical Exam   Constitutional: She is oriented to person, place, and time  She appears well-developed and well-nourished  HENT:   Head: Normocephalic and atraumatic  Neck: Normal range of motion  Cardiovascular: Normal rate and regular rhythm  Pulmonary/Chest: Effort normal and breath sounds normal    Neurological: She is alert and oriented to person, place, and time  Skin: Skin is warm and dry  Deer tick embedded in the right lateral aspect of the chest wall  Psychiatric: She has a normal mood and affect  Her behavior is normal    Nursing note and vitals reviewed  Foreign body removal  Date/Time: 7/18/2019 12:23 PM  Performed by: Anthony Lees PA-C  Authorized by: Anthony Lees PA-C   [place] Protocol:Consent: Verbal consent obtained  Risks and benefits: risks, benefits and alternatives were discussed  Consent given by: patient  Patient understanding: patient states understanding of the procedure being performed  Patient identity confirmed: verbally with patient    Body area: skin  General location: trunk  Location details: chest  Removal mechanism: forceps  Complexity: simple  1 objects recovered    Objects recovered: Deer tick  Post-procedure assessment: foreign body removed  Patient tolerance: Patient tolerated the procedure well with no immediate complications

## 2019-07-18 NOTE — PATIENT INSTRUCTIONS
Tick Bite   AMBULATORY CARE:   What you need to know about a tick bite:  Most tick bites are not dangerous, but ticks can pass disease or infection when they bite  Ticks need to be removed quickly  You may have redness, pain, itching, and swelling near the bite  Blisters may also develop  Seek care immediately if:   · You have trouble walking or moving your legs  · You have joint pain, muscle pain, or muscle weakness within 1 month of a tick bite  · You have a fever, chills, headache, or rash  Contact your healthcare provider if:   · You cannot remove the tick  · The tick's head is stuck in your skin  · You have questions or concerns about your condition or care  Treatment for a tick bite:   · Apply ice  on your bite for 15 to 20 minutes every hour or as directed  Use an ice pack, or put crushed ice in a plastic bag  Cover it with a towel before you apply it to your skin  Ice helps prevent tissue damage and decreases swelling and pain  · Medicines  help decrease pain, redness, itching, and swelling  You may also need medicine to prevent or fight a bacterial infection  These medicines may be given as a cream, lotion, or pill  How to remove a tick:  Remove the tick as soon as possible to help prevent disease or infection  You are less likely to get sick from a tick bite if you remove the tick within 24 hours  Do not use petroleum jelly, nail polish, rubbing alcohol, or heat  These do not work and may be dangerous  Do the following to remove a tick:  · First, try a soapy cotton ball  Soak a cotton ball in liquid soap  Cover the tick with the cotton ball for 30 seconds  The tick may come off with the cotton ball when you pull it away  · Use tweezers if the soapy cotton ball does not work  Grasp the tick as close to your skin as possible  Pull the tick straight up and out  Do not touch the tick with your bare hands      · Do not twist or jerk the tick suddenly, because this may break off the tick's head or mouth parts  Do not leave any part of the tick in your skin  · Do not crush or squeeze the tick since its body may be infected with germs  Flush the tick down the toilet  · After the tick is removed, clean the area of the bite with rubbing alcohol  Then wash your hands with soap and water  Prevent a tick bite:  Ticks live in areas covered by brush and grass  They may even be found in your lawn if you live in certain areas  Outdoor pets can carry ticks inside the house  Ticks can grab onto you or your clothes when you walk by grass or brush  If you go into areas that contain many trees, tall grasses, and underbrush, do the following:  · Wear light colored pants and a long-sleeved shirt  Tuck your pants into your socks or boots  Tuck in your shirt  Wear sleeves that fit close to the skin at your wrists and neck  This will help prevent ticks from crawling through gaps in your clothing and onto your skin  Wear a hat in areas with trees  · Apply insect repellant on your skin  The insect repellant should contain DEET  Do not put insect repellant on skin that is cut, scratched, or irritated  Always use soap and water to wash the insect repellant off as soon as possible once you are indoors  Do not apply insect repellant on your child's face or hands  · Spray insect repellant onto your clothes  Use permethrin spray  This spray kills ticks that crawl on your clothing  Be sure to spray the tops of your boots, bottom of pant legs, and sleeve cuffs  As soon as possible, wash and dry clothing in hot water and high heat  · Check your clothing, hair, and skin for ticks  Shower within 2 hours of coming indoors  Carefully check the hairline, armpits, neck, and waist  Check your pets and children for ticks  Remove ticks from pets the same way as you remove them from people  · Decrease the risk for ticks in your yard  Ticks like to live in shady, moist areas   Margi Veeque your lawn regularly to keep the grass short  Trim the grass around birdbaths and fences  Cut branches that are overgrown and take them out of the yard  Clear out leaf piles  Gwen Kingnt firewood in a dry, michael area  Follow up with your healthcare provider as directed:  Write down your questions so you remember to ask them during your visits  © 2017 [employer] [employer] Information is for End User's use only and may not be sold, redistributed or otherwise used for commercial purposes  All illustrations and images included in CareNotes® are the copyrighted property of A D A M [employer] Inc  or Keshav Gasca  The above information is an  only  It is not intended as medical advice for individual conditions or treatments  Talk to your doctor, nurse or pharmacist before following any medical regimen to see if it is safe and effective for you

## 2019-10-27 ENCOUNTER — OFFICE VISIT (OUTPATIENT)
Dept: URGENT CARE | Facility: CLINIC | Age: 59
End: 2019-10-27
Payer: COMMERCIAL

## 2019-10-27 VITALS
DIASTOLIC BLOOD PRESSURE: 50 MMHG | SYSTOLIC BLOOD PRESSURE: 99 MMHG | HEART RATE: 71 BPM | TEMPERATURE: 97.4 F | OXYGEN SATURATION: 98 % | RESPIRATION RATE: 16 BRPM
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
## 2019-10-27 DIAGNOSIS — S30.861A TICK BITE OF ABDOMEN, INITIAL ENCOUNTER: Primary | ICD-10-CM

## 2019-10-27 PROCEDURE — G0381 LEV 2 HOSP TYPE B ED VISIT: HCPCS | Performed by: NURSE PRACTITIONER

## 2019-10-27 PROCEDURE — S9083 URGENT CARE CENTER GLOBAL: HCPCS | Performed by: NURSE PRACTITIONER

## 2019-10-27 RX ORDER — DOXYCYCLINE 100 MG/1
200 TABLET ORAL ONCE
Qty: 2 TABLET | Refills: 0 | Status: SHIPPED | OUTPATIENT
Start: 2019-10-27 | End: 2019-10-27

## 2019-10-27 NOTE — PATIENT INSTRUCTIONS
Take the 2 capsule, 1x dose of doxycycline  Read the handout below and just watch for any symptoms over the next month; if any develop, you should be seen again  Tick Bite   AMBULATORY CARE:   What you need to know about a tick bite:  Most tick bites are not dangerous, but ticks can pass disease or infection when they bite  Ticks need to be removed quickly  You may have redness, pain, itching, and swelling near the bite  Blisters may also develop  Seek care immediately if:   · You have trouble walking or moving your legs  · You have joint pain, muscle pain, or muscle weakness within 1 month of a tick bite  · You have a fever, chills, headache, or rash  Contact your healthcare provider if:   · You cannot remove the tick  · The tick's head is stuck in your skin  · You have questions or concerns about your condition or care  Treatment for a tick bite:   · Apply ice  on your bite for 15 to 20 minutes every hour or as directed  Use an ice pack, or put crushed ice in a plastic bag  Cover it with a towel before you apply it to your skin  Ice helps prevent tissue damage and decreases swelling and pain  · Medicines  help decrease pain, redness, itching, and swelling  You may also need medicine to prevent or fight a bacterial infection  These medicines may be given as a cream, lotion, or pill  How to remove a tick:  Remove the tick as soon as possible to help prevent disease or infection  You are less likely to get sick from a tick bite if you remove the tick within 24 hours  Do not use petroleum jelly, nail polish, rubbing alcohol, or heat  These do not work and may be dangerous  Do the following to remove a tick:  · First, try a soapy cotton ball  Soak a cotton ball in liquid soap  Cover the tick with the cotton ball for 30 seconds  The tick may come off with the cotton ball when you pull it away  · Use tweezers if the soapy cotton ball does not work   Grasp the tick as close to your skin as possible  Pull the tick straight up and out  Do not touch the tick with your bare hands  · Do not twist or jerk the tick suddenly, because this may break off the tick's head or mouth parts  Do not leave any part of the tick in your skin  · Do not crush or squeeze the tick since its body may be infected with germs  Flush the tick down the toilet  · After the tick is removed, clean the area of the bite with rubbing alcohol  Then wash your hands with soap and water  Prevent a tick bite:  Ticks live in areas covered by brush and grass  They may even be found in your lawn if you live in certain areas  Outdoor pets can carry ticks inside the house  Ticks can grab onto you or your clothes when you walk by grass or brush  If you go into areas that contain many trees, tall grasses, and underbrush, do the following:  · Wear light colored pants and a long-sleeved shirt  Tuck your pants into your socks or boots  Tuck in your shirt  Wear sleeves that fit close to the skin at your wrists and neck  This will help prevent ticks from crawling through gaps in your clothing and onto your skin  Wear a hat in areas with trees  · Apply insect repellant on your skin  The insect repellant should contain DEET  Do not put insect repellant on skin that is cut, scratched, or irritated  Always use soap and water to wash the insect repellant off as soon as possible once you are indoors  Do not apply insect repellant on your child's face or hands  · Spray insect repellant onto your clothes  Use permethrin spray  This spray kills ticks that crawl on your clothing  Be sure to spray the tops of your boots, bottom of pant legs, and sleeve cuffs  As soon as possible, wash and dry clothing in hot water and high heat  · Check your clothing, hair, and skin for ticks  Shower within 2 hours of coming indoors  Carefully check the hairline, armpits, neck, and waist  Check your pets and children for ticks   Remove ticks from pets the same way as you remove them from people  · Decrease the risk for ticks in your yard  Ticks like to live in shady, moist areas  [place]y [place] your lawn regularly to keep the grass short  Trim the grass around birdbaths and fences  Cut branches that are overgrown and take them out of the yard  Clear out leaf piles  [place] in a dry, michael area  Follow up with your healthcare provider as directed:  Write down your questions so you remember to ask them during your visits  © 2017 [facility] Information is for End User's use only and may not be sold, redistributed or otherwise used for commercial purposes  All illustrations and images included in CareNotes® are the copyrighted property of [employer]  or Keshav Gasca  The above information is an  only  It is not intended as medical advice for individual conditions or treatments  Talk to your doctor, nurse or pharmacist before following any medical regimen to see if it is safe and effective for you

## 2019-10-27 NOTE — PROGRESS NOTES
[facility]        NAME: Phyllis Simons is a 63 y o  female  : 1960    MRN: [id number]  DATE: 2019  TIME: 10:00 AM    Assessment and Plan   Tick bite of abdomen, initial encounter [A10 647B, W57  XXXA]  1  Tick bite of abdomen, initial encounter  doxycycline (ADOXA) 100 MG tablet         Patient Instructions     Patient Instructions   Take the 2 capsule, 1x dose of doxycycline  Read the handout below and just watch for any symptoms over the next month; if any develop, you should be seen again  Tick Bite   AMBULATORY CARE:   What you need to know about a tick bite:  Most tick bites are not dangerous, but ticks can pass disease or infection when they bite  Ticks need to be removed quickly  You may have redness, pain, itching, and swelling near the bite  Blisters may also develop  Seek care immediately if:   · You have trouble walking or moving your legs  · You have joint pain, muscle pain, or muscle weakness within 1 month of a tick bite  · You have a fever, chills, headache, or rash  Contact your healthcare provider if:   · You cannot remove the tick  · The tick's head is stuck in your skin  · You have questions or concerns about your condition or care  Treatment for a tick bite:   · Apply ice  on your bite for 15 to 20 minutes every hour or as directed  Use an ice pack, or put crushed ice in a plastic bag  Cover it with a towel before you apply it to your skin  Ice helps prevent tissue damage and decreases swelling and pain  · Medicines  help decrease pain, redness, itching, and swelling  You may also need medicine to prevent or fight a bacterial infection  These medicines may be given as a cream, lotion, or pill  How to remove a tick:  Remove the tick as soon as possible to help prevent disease or infection  You are less likely to get sick from a tick bite if you remove the tick within 24 hours  Do not use petroleum jelly, nail polish, rubbing alcohol, or heat  These do not work and may be dangerous  Do the following to remove a tick:  · First, try a soapy cotton ball  Soak a cotton ball in liquid soap  Cover the tick with the cotton ball for 30 seconds  The tick may come off with the cotton ball when you pull it away  · Use tweezers if the soapy cotton ball does not work  Grasp the tick as close to your skin as possible  Pull the tick straight up and out  Do not touch the tick with your bare hands  · Do not twist or jerk the tick suddenly, because this may break off the tick's head or mouth parts  Do not leave any part of the tick in your skin  · Do not crush or squeeze the tick since its body may be infected with germs  Flush the tick down the toilet  · After the tick is removed, clean the area of the bite with rubbing alcohol  Then wash your hands with soap and water  Prevent a tick bite:  Ticks live in areas covered by brush and grass  They may even be found in your lawn if you live in certain areas  Outdoor pets can carry ticks inside the house  Ticks can grab onto you or your clothes when you walk by grass or brush  If you go into areas that contain many trees, tall grasses, and underbrush, do the following:  · Wear light colored pants and a long-sleeved shirt  Tuck your pants into your socks or boots  Tuck in your shirt  Wear sleeves that fit close to the skin at your wrists and neck  This will help prevent ticks from crawling through gaps in your clothing and onto your skin  Wear a hat in areas with trees  · Apply insect repellant on your skin  The insect repellant should contain DEET  Do not put insect repellant on skin that is cut, scratched, or irritated  Always use soap and water to wash the insect repellant off as soon as possible once you are indoors  Do not apply insect repellant on your child's face or hands  · Spray insect repellant onto your clothes  Use permethrin spray  This spray kills ticks that crawl on your clothing   Be sure to spray the tops of your boots, bottom of pant legs, and sleeve cuffs  As soon as possible, wash and dry clothing in hot water and high heat  · Check your clothing, hair, and skin for ticks  Shower within 2 hours of coming indoors  Carefully check the hairline, armpits, neck, and waist  Check your pets and children for ticks  Remove ticks from pets the same way as you remove them from people  · Decrease the risk for ticks in your yard  Ticks like to live in shady, moist areas  Rita Schneiders your lawn regularly to keep the grass short  Trim the grass around birdbaths and fences  Cut branches that are overgrown and take them out of the yard  Clear out leaf piles  Malik Velha firewood in a dry, michael area  Follow up with your healthcare provider as directed:  Write down your questions so you remember to ask them during your visits  © 2017 [address] [employer] Information is for End User's use only and may not be sold, redistributed or otherwise used for commercial purposes  All illustrations and images included in CareNotes® are the copyrighted property of [employer] , [employer]  or Keshav Gasca  The above information is an  only  It is not intended as medical advice for individual conditions or treatments  Talk to your doctor, nurse or pharmacist before following any medical regimen to see if it is safe and effective for you  Follow up with PCP in 3-5 days  Proceed to  ER if symptoms worsen  Chief Complaint     Chief Complaint   Patient presents with    Tick Bite     Pt reports that she removed a tick last night right side  History of Present Illness       Patient found that removed a live deer tick from her right abdomen last night  She is not sure exactly how long it was on, but she shows me the specimen in a plastic bag, and it was not engorged    Patient reports that about a year ago she had Lyme disease and was hospitalized due to severity of symptoms, so she presents here today requesting the 2 capsule burst treatment  Review of Systems   Review of Systems   Skin: Positive for color change and wound  All other systems reviewed and are negative          Current Medications       Current Outpatient Medications:     alendronate (FOSAMAX) 70 mg tablet, Take 1 tablet (70 mg total) by mouth every 7 days, Disp: 12 tablet, Rfl: 3    atorvastatin (LIPITOR) 10 mg tablet, Take 1 tablet (10 mg total) by mouth daily, Disp: 90 tablet, Rfl: 3    Brexpiprazole (REXULTI PO), Take by mouth, Disp: , Rfl:     buPROPion (WELLBUTRIN SR) 200 MG 12 hr tablet, Take 200 mg by mouth daily, Disp: , Rfl: 1    buPROPion (WELLBUTRIN) 75 mg tablet, Take 1 tablet (75 mg total) by mouth 2 (two) times a day (Patient not taking: Reported on 7/18/2019), Disp: 180 tablet, Rfl: 3    doxycycline (ADOXA) 100 MG tablet, Take 2 tablets (200 mg total) by mouth once for 1 dose, Disp: 2 tablet, Rfl: 0    FLUoxetine (PROzac) 20 mg capsule, Take 4 capsules (80 mg total) by mouth daily, Disp: 360 capsule, Rfl: 3    fluticasone (FLONASE) 50 mcg/act nasal spray, 2 sprays into each nostril, Disp: , Rfl:     latanoprost (XALATAN) 0 005 % ophthalmic solution, , Disp: , Rfl:     oxyCODONE (ROXICODONE) 5 mg immediate release tablet, Take 1 tablet by mouth 2 (two) times a day, Disp: , Rfl:     traZODone (DESYREL) 150 mg tablet, Take 1 tablet (150 mg total) by mouth daily at bedtime, Disp: 90 tablet, Rfl: 3    Current Allergies     Allergies as of 10/27/2019 - Reviewed 10/27/2019   Allergen Reaction Noted    Morphine  05/09/2016    Morphine and related  05/08/2012            The following portions of the patient's history were reviewed and updated as appropriate: allergies, current medications, past family history, past medical history, past social history, past surgical history and problem list      Past Medical History:   Diagnosis Date    Allergic     Anxiety     last assessed 11/17/17    Arthritis     Chronic fatigue syndrome     last assessed 2/25/14    Chronic [facility] C ([facility] [address] )     last assessed 4/12/17    Depression     Esophageal reflux     last assessed 10/16/13    Fibromyalgia     Gallbladder disease     Glaucoma     Hyperlipidemia     last assessed 4/12/17    Insomnia        Past Surgical History:   Procedure Laterality Date    APPENDECTOMY      resolved 8/2000    CHOLECYSTECTOMY      EAR SURGERY      resolved 4/5/00    GASTRIC BYPASS      resolved 7/06    NEUROPLASTY / TRANSPOSITION MEDIAN NERVE AT CARPAL TUNNEL BILATERAL      resolved 10/11    ROTATOR CUFF REPAIR         Family History   Problem Relation Age of Onset    Arthritis Mother     Coronary artery disease Mother     Mental illness Mother     Heart disease Mother     Hypertension Mother     Mental illness Father     Coronary artery disease Family          Medications have been verified  Objective   BP 99/50   Pulse 71   Temp (!) 97 4 °F (36 3 °C)   Resp 16   SpO2 98%        Physical Exam     Physical Exam   Constitutional: She is oriented to person, place, and time  She appears well-developed and well-nourished  No distress  HENT:   Head: Normocephalic and atraumatic  Abdominal: Soft  She exhibits no distension  Musculoskeletal: Normal range of motion  Neurological: She is alert and oriented to person, place, and time  Skin: Skin is warm and dry  Capillary refill takes less than 2 seconds  She is not diaphoretic  There is erythema (Localized erythema only, no warmth)  Psychiatric: She has a normal mood and affect  Her behavior is normal  Judgment and thought content normal    Nursing note and vitals reviewed

## 2019-12-27 ENCOUNTER — OFFICE VISIT (OUTPATIENT)
Dept: FAMILY MEDICINE CLINIC | Facility: CLINIC | Age: 59
End: 2019-12-27
Payer: COMMERCIAL

## 2019-12-27 VITALS
HEART RATE: 64 BPM | BODY MASS INDEX: 23.56 KG/M2 | SYSTOLIC BLOOD PRESSURE: 110 MMHG | DIASTOLIC BLOOD PRESSURE: 68 MMHG | OXYGEN SATURATION: 97 % | TEMPERATURE: 97.8 F | HEIGHT: 64 IN | WEIGHT: 138 LBS

## 2019-12-27 DIAGNOSIS — J01.00 ACUTE NON-RECURRENT MAXILLARY SINUSITIS: Primary | ICD-10-CM

## 2019-12-27 DIAGNOSIS — Z11.4 SCREENING FOR HIV (HUMAN IMMUNODEFICIENCY VIRUS): ICD-10-CM

## 2019-12-27 DIAGNOSIS — B37.9 CANDIDIASIS: ICD-10-CM

## 2019-12-27 PROBLEM — Z92.89 HISTORY OF SCREENING MAMMOGRAPHY: Status: RESOLVED | Noted: 2019-04-18 | Resolved: 2019-12-27

## 2019-12-27 PROBLEM — B37.31 VAGINAL CANDIDIASIS: Status: RESOLVED | Noted: 2019-04-29 | Resolved: 2019-12-27

## 2019-12-27 PROBLEM — J20.9 ACUTE BRONCHITIS: Status: RESOLVED | Noted: 2019-04-29 | Resolved: 2019-12-27

## 2019-12-27 PROBLEM — F10.10 ALCOHOL ABUSE: Status: RESOLVED | Noted: 2019-04-18 | Resolved: 2019-12-27

## 2019-12-27 PROBLEM — B37.3 VAGINAL CANDIDIASIS: Status: RESOLVED | Noted: 2019-04-29 | Resolved: 2019-12-27

## 2019-12-27 PROCEDURE — 3008F BODY MASS INDEX DOCD: CPT | Performed by: NURSE PRACTITIONER

## 2019-12-27 PROCEDURE — 4004F PT TOBACCO SCREEN RCVD TLK: CPT | Performed by: NURSE PRACTITIONER

## 2019-12-27 PROCEDURE — 99213 OFFICE O/P EST LOW 20 MIN: CPT | Performed by: NURSE PRACTITIONER

## 2019-12-27 RX ORDER — AZITHROMYCIN 250 MG/1
TABLET, FILM COATED ORAL
Qty: 6 TABLET | Refills: 0 | Status: SHIPPED | OUTPATIENT
Start: 2019-12-27 | End: 2019-12-31

## 2019-12-27 RX ORDER — FLUCONAZOLE 150 MG/1
150 TABLET ORAL ONCE
Qty: 1 TABLET | Refills: 0 | Status: SHIPPED | OUTPATIENT
Start: 2019-12-27 | End: 2019-12-27

## 2019-12-27 NOTE — PROGRESS NOTES
Assessment/Plan:    No problem-specific Assessment & Plan notes found for this encounter  Diagnoses and all orders for this visit:    Acute non-recurrent maxillary sinusitis  Comments:  will treat for infection   Orders:  -     azithromycin (ZITHROMAX) 250 mg tablet; Take 2 tablets today then 1 tablet daily x 4 days    Screening for HIV (human immunodeficiency virus)  -     HIV 1/2 AG-AB combo; Future    Candidiasis  Comments:  fluconazole ordered   Orders:  -     fluconazole (DIFLUCAN) 150 mg tablet; Take 1 tablet (150 mg total) by mouth once for 1 dose          Subjective:      Patient ID: Kanu Blankenship is a 63 y o  female  Patient here and reports that she is going to be traveling tomorrow to [facility] and reports that she will be spending the week in the state  Patient reports that she is having sympotms of a constant sneezing and left ear pain and popping and feeling itchy and has not started with a cough and not having any n/v/d  Patient is currently a smoker and is currently smoking  Patient is also following with psychiatry  The following portions of the patient's history were reviewed and updated as appropriate:   She  has a past medical history of Allergic, Anxiety, Arthritis, Chronic fatigue syndrome, Chronic hepatitis C ([facility] [address] ), Depression, Esophageal reflux, Fibromyalgia, Gallbladder disease, Glaucoma, Hyperlipidemia, and Insomnia    She   Patient Active Problem List    Diagnosis Date Noted    Screening for HIV (human immunodeficiency virus) 12/27/2019    Acute non-recurrent maxillary sinusitis 12/27/2019    Candidiasis 12/27/2019    Alcoholism in recovery ([facility] ) 12/18/2018    [facility] of recurrent [facility] 12/18/2018    Tobacco abuse 06/23/2018    Osteoporosis 09/12/2016    Chronic insomnia 05/19/2015    Hyperlipidemia 07/11/2013    Glaucoma 08/27/2012    Arthritis 07/05/2012     She  has a past surgical history that includes Appendectomy; EAR SURGERY; Gastric bypass; Neuroplasty / transposition median nerve at carpal tunnel bilateral; Rotator cuff repair; and Cholecystectomy  Her family history includes Arthritis in her mother; Coronary artery disease in her family and mother; Heart disease in her mother; Hypertension in her mother; Mental illness in her father and mother  She  reports that she has been smoking cigarettes  She has a 60 00 pack-year smoking history  She has never used smokeless tobacco  She reports that she drinks about 12 0 standard drinks of alcohol per week  She reports that she does not use drugs  She is allergic to morphine and morphine and related       Review of Systems   Constitutional: Negative for activity change, appetite change, chills, diaphoresis, fatigue, fever and unexpected weight change  HENT: Positive for congestion, ear pain, sinus pressure and sinus pain  Negative for hearing loss, postnasal drip, sneezing and sore throat  Eyes: Negative for pain, redness and visual disturbance  Respiratory: Negative for cough and shortness of breath  Cardiovascular: Negative for chest pain and leg swelling  Gastrointestinal: Negative for abdominal pain, diarrhea, nausea and vomiting  Endocrine: Negative  Genitourinary: Negative  Musculoskeletal: Negative for arthralgias  Skin: Negative  Allergic/Immunologic: Negative  Neurological: Negative for dizziness and light-headedness  Psychiatric/Behavioral: Negative for behavioral problems and dysphoric mood  Objective:      /68   Pulse 64   Temp 97 8 °F (36 6 °C) (Tympanic)   Ht 5' 4" (1 626 m)   Wt 62 6 kg (138 lb)   SpO2 97%   BMI 23 69 kg/m²          Physical Exam   Constitutional: She is oriented to person, place, and time  Vital signs are normal  She appears well-developed and well-nourished  No distress  HENT:   Head: Normocephalic and atraumatic     Right Ear: A middle ear effusion is present  Left Ear: A middle ear effusion is present  Nose: Rhinorrhea present  Right sinus exhibits maxillary sinus tenderness  Right sinus exhibits no frontal sinus tenderness  Left sinus exhibits maxillary sinus tenderness  Mouth/Throat: Uvula is midline, oropharynx is clear and moist and mucous membranes are normal    Eyes: Pupils are equal, round, and reactive to light  Neck: Normal range of motion  No thyromegaly present  Cardiovascular: Normal rate, regular rhythm, normal heart sounds and intact distal pulses  No murmur heard  Pulmonary/Chest: Effort normal and breath sounds normal  No respiratory distress  She has no wheezes  Abdominal: Soft  Bowel sounds are normal    Musculoskeletal: Normal range of motion  Neurological: She is alert and oriented to person, place, and time  Skin: Skin is warm and dry  Psychiatric: She has a normal mood and affect  Nursing note and vitals reviewed

## 2020-03-13 ENCOUNTER — APPOINTMENT (OUTPATIENT)
Dept: RADIOLOGY | Facility: CLINIC | Age: 60
End: 2020-03-13
Payer: COMMERCIAL

## 2020-03-13 ENCOUNTER — TRANSCRIBE ORDERS (OUTPATIENT)
Dept: RADIOLOGY | Facility: CLINIC | Age: 60
End: 2020-03-13

## 2020-03-13 DIAGNOSIS — Z91.81 STATUS POST FALL: Primary | ICD-10-CM

## 2020-03-13 DIAGNOSIS — Z91.81 STATUS POST FALL: ICD-10-CM

## 2020-03-13 PROCEDURE — 72100 X-RAY EXAM L-S SPINE 2/3 VWS: CPT

## 2020-03-30 ENCOUNTER — TRANSCRIBE ORDERS (OUTPATIENT)
Dept: ADMINISTRATIVE | Facility: HOSPITAL | Age: 60
End: 2020-03-30

## 2020-03-30 ENCOUNTER — APPOINTMENT (OUTPATIENT)
Dept: LAB | Facility: CLINIC | Age: 60
End: 2020-03-30
Payer: COMMERCIAL

## 2020-03-30 DIAGNOSIS — E78.5 ELEVATED LIPIDS: Primary | ICD-10-CM

## 2020-03-30 DIAGNOSIS — R53.83 FATIGUE, UNSPECIFIED TYPE: ICD-10-CM

## 2020-03-30 DIAGNOSIS — Z11.4 SCREENING FOR HIV (HUMAN IMMUNODEFICIENCY VIRUS): ICD-10-CM

## 2020-03-30 DIAGNOSIS — E78.5 ELEVATED LIPIDS: ICD-10-CM

## 2020-03-30 LAB
ALBUMIN SERPL BCP-MCNC: 3.8 G/DL (ref 3.5–5)
ALP SERPL-CCNC: 71 U/L (ref 46–116)
ALT SERPL W P-5'-P-CCNC: 31 U/L (ref 12–78)
ANION GAP SERPL CALCULATED.3IONS-SCNC: 4 MMOL/L (ref 4–13)
AST SERPL W P-5'-P-CCNC: 33 U/L (ref 5–45)
BACTERIA UR QL AUTO: NORMAL /HPF
BASOPHILS # BLD AUTO: 0.09 THOUSANDS/ΜL (ref 0–0.1)
BASOPHILS NFR BLD AUTO: 1 % (ref 0–1)
BILIRUB SERPL-MCNC: 0.21 MG/DL (ref 0.2–1)
BILIRUB UR QL STRIP: NEGATIVE
BUN SERPL-MCNC: 5 MG/DL (ref 5–25)
CALCIUM SERPL-MCNC: 8.8 MG/DL (ref 8.3–10.1)
CHLORIDE SERPL-SCNC: 102 MMOL/L (ref 100–108)
CHOLEST SERPL-MCNC: 149 MG/DL (ref 50–200)
CLARITY UR: CLEAR
CO2 SERPL-SCNC: 29 MMOL/L (ref 21–32)
COLOR UR: YELLOW
CREAT SERPL-MCNC: 0.65 MG/DL (ref 0.6–1.3)
EOSINOPHIL # BLD AUTO: 0.5 THOUSAND/ΜL (ref 0–0.61)
EOSINOPHIL NFR BLD AUTO: 8 % (ref 0–6)
ERYTHROCYTE [DISTWIDTH] IN BLOOD BY AUTOMATED COUNT: 15.2 % (ref 11.6–15.1)
GFR SERPL CREATININE-BSD FRML MDRD: 98 ML/MIN/1.73SQ M
GLUCOSE SERPL-MCNC: 102 MG/DL (ref 65–140)
GLUCOSE UR STRIP-MCNC: NEGATIVE MG/DL
HCT VFR BLD AUTO: 35.4 % (ref 34.8–46.1)
HDLC SERPL-MCNC: 65 MG/DL
HGB BLD-MCNC: 11 G/DL (ref 11.5–15.4)
HGB UR QL STRIP.AUTO: NEGATIVE
HYALINE CASTS #/AREA URNS LPF: NORMAL /LPF
IMM GRANULOCYTES # BLD AUTO: 0.01 THOUSAND/UL (ref 0–0.2)
IMM GRANULOCYTES NFR BLD AUTO: 0 % (ref 0–2)
IRON SATN MFR SERPL: 5 %
IRON SERPL-MCNC: 25 UG/DL (ref 50–170)
KETONES UR STRIP-MCNC: NEGATIVE MG/DL
LDLC SERPL CALC-MCNC: 68 MG/DL (ref 0–100)
LEUKOCYTE ESTERASE UR QL STRIP: ABNORMAL
LYMPHOCYTES # BLD AUTO: 1.8 THOUSANDS/ΜL (ref 0.6–4.47)
LYMPHOCYTES NFR BLD AUTO: 28 % (ref 14–44)
MCH RBC QN AUTO: 27 PG (ref 26.8–34.3)
MCHC RBC AUTO-ENTMCNC: 31.1 G/DL (ref 31.4–37.4)
MCV RBC AUTO: 87 FL (ref 82–98)
MONOCYTES # BLD AUTO: 0.59 THOUSAND/ΜL (ref 0.17–1.22)
MONOCYTES NFR BLD AUTO: 9 % (ref 4–12)
NEUTROPHILS # BLD AUTO: 3.41 THOUSANDS/ΜL (ref 1.85–7.62)
NEUTS SEG NFR BLD AUTO: 54 % (ref 43–75)
NITRITE UR QL STRIP: NEGATIVE
NON-SQ EPI CELLS URNS QL MICRO: NORMAL /HPF
NONHDLC SERPL-MCNC: 84 MG/DL
NRBC BLD AUTO-RTO: 0 /100 WBCS
PH UR STRIP.AUTO: 6.5 [PH]
PLATELET # BLD AUTO: 216 THOUSANDS/UL (ref 149–390)
PMV BLD AUTO: 11.6 FL (ref 8.9–12.7)
POTASSIUM SERPL-SCNC: 3.8 MMOL/L (ref 3.5–5.3)
PROT SERPL-MCNC: 7.3 G/DL (ref 6.4–8.2)
PROT UR STRIP-MCNC: NEGATIVE MG/DL
RBC # BLD AUTO: 4.08 MILLION/UL (ref 3.81–5.12)
RBC #/AREA URNS AUTO: NORMAL /HPF
SODIUM SERPL-SCNC: 135 MMOL/L (ref 136–145)
SP GR UR STRIP.AUTO: 1.01 (ref 1–1.03)
TIBC SERPL-MCNC: 519 UG/DL (ref 250–450)
TRIGL SERPL-MCNC: 81 MG/DL
TSH SERPL DL<=0.05 MIU/L-ACNC: 4.02 UIU/ML (ref 0.36–3.74)
UROBILINOGEN UR QL STRIP.AUTO: 0.2 E.U./DL
WBC # BLD AUTO: 6.4 THOUSAND/UL (ref 4.31–10.16)
WBC #/AREA URNS AUTO: NORMAL /HPF

## 2020-03-30 PROCEDURE — 83550 IRON BINDING TEST: CPT

## 2020-03-30 PROCEDURE — 83540 ASSAY OF IRON: CPT

## 2020-03-30 PROCEDURE — 81001 URINALYSIS AUTO W/SCOPE: CPT | Performed by: FAMILY MEDICINE

## 2020-03-30 PROCEDURE — 85025 COMPLETE CBC W/AUTO DIFF WBC: CPT

## 2020-03-30 PROCEDURE — 80053 COMPREHEN METABOLIC PANEL: CPT

## 2020-03-30 PROCEDURE — 80061 LIPID PANEL: CPT

## 2020-03-30 PROCEDURE — 84443 ASSAY THYROID STIM HORMONE: CPT

## 2020-03-30 PROCEDURE — 36415 COLL VENOUS BLD VENIPUNCTURE: CPT

## 2020-03-30 PROCEDURE — 87389 HIV-1 AG W/HIV-1&-2 AB AG IA: CPT

## 2020-03-31 LAB — HIV 1+2 AB+HIV1 P24 AG SERPL QL IA: NORMAL

## 2020-07-10 ENCOUNTER — TRANSCRIBE ORDERS (OUTPATIENT)
Dept: LAB | Facility: CLINIC | Age: 60
End: 2020-07-10

## 2020-07-10 ENCOUNTER — APPOINTMENT (OUTPATIENT)
Dept: LAB | Facility: CLINIC | Age: 60
End: 2020-07-10
Payer: COMMERCIAL

## 2020-07-10 DIAGNOSIS — E03.9 HYPOTHYROIDISM, UNSPECIFIED TYPE: ICD-10-CM

## 2020-07-10 DIAGNOSIS — R53.83 FATIGUE, UNSPECIFIED TYPE: ICD-10-CM

## 2020-07-10 DIAGNOSIS — E03.9 HYPOTHYROIDISM, UNSPECIFIED TYPE: Primary | ICD-10-CM

## 2020-07-10 LAB
T4 FREE SERPL-MCNC: 0.93 NG/DL (ref 0.76–1.46)
TSH SERPL DL<=0.05 MIU/L-ACNC: 1.66 UIU/ML (ref 0.36–3.74)

## 2020-07-10 PROCEDURE — 36415 COLL VENOUS BLD VENIPUNCTURE: CPT

## 2020-07-10 PROCEDURE — 84443 ASSAY THYROID STIM HORMONE: CPT

## 2020-07-10 PROCEDURE — 84439 ASSAY OF FREE THYROXINE: CPT

## 2021-03-11 ENCOUNTER — APPOINTMENT (OUTPATIENT)
Dept: LAB | Facility: CLINIC | Age: 61
End: 2021-03-11
Payer: COMMERCIAL

## 2021-03-11 ENCOUNTER — TRANSCRIBE ORDERS (OUTPATIENT)
Dept: LAB | Facility: CLINIC | Age: 61
End: 2021-03-11

## 2021-03-11 DIAGNOSIS — E78.00 HIGH CHOLESTEROL: ICD-10-CM

## 2021-03-11 DIAGNOSIS — E61.1 IRON DEFICIENCY: ICD-10-CM

## 2021-03-11 DIAGNOSIS — E78.00 HIGH CHOLESTEROL: Primary | ICD-10-CM

## 2021-03-11 DIAGNOSIS — Z98.84 S/P GASTRIC BYPASS: ICD-10-CM

## 2021-03-11 LAB
ALBUMIN SERPL BCP-MCNC: 3.7 G/DL (ref 3.5–5)
ALP SERPL-CCNC: 75 U/L (ref 46–116)
ALT SERPL W P-5'-P-CCNC: 73 U/L (ref 12–78)
ANION GAP SERPL CALCULATED.3IONS-SCNC: 4 MMOL/L (ref 4–13)
AST SERPL W P-5'-P-CCNC: 58 U/L (ref 5–45)
BASOPHILS # BLD AUTO: 0.07 THOUSANDS/ΜL (ref 0–0.1)
BASOPHILS NFR BLD AUTO: 2 % (ref 0–1)
BILIRUB SERPL-MCNC: 0.42 MG/DL (ref 0.2–1)
BUN SERPL-MCNC: 7 MG/DL (ref 5–25)
CALCIUM SERPL-MCNC: 9.1 MG/DL (ref 8.3–10.1)
CHLORIDE SERPL-SCNC: 98 MMOL/L (ref 100–108)
CHOLEST SERPL-MCNC: 149 MG/DL (ref 50–200)
CO2 SERPL-SCNC: 31 MMOL/L (ref 21–32)
CREAT SERPL-MCNC: 0.77 MG/DL (ref 0.6–1.3)
EOSINOPHIL # BLD AUTO: 0.43 THOUSAND/ΜL (ref 0–0.61)
EOSINOPHIL NFR BLD AUTO: 9 % (ref 0–6)
ERYTHROCYTE [DISTWIDTH] IN BLOOD BY AUTOMATED COUNT: 13.2 % (ref 11.6–15.1)
FOLATE SERPL-MCNC: 12.5 NG/ML (ref 3.1–17.5)
GFR SERPL CREATININE-BSD FRML MDRD: 85 ML/MIN/1.73SQ M
GLUCOSE P FAST SERPL-MCNC: 97 MG/DL (ref 65–99)
HCT VFR BLD AUTO: 44.5 % (ref 34.8–46.1)
HDLC SERPL-MCNC: 66 MG/DL
HGB BLD-MCNC: 14.5 G/DL (ref 11.5–15.4)
IMM GRANULOCYTES # BLD AUTO: 0.01 THOUSAND/UL (ref 0–0.2)
IMM GRANULOCYTES NFR BLD AUTO: 0 % (ref 0–2)
IRON SATN MFR SERPL: 23 %
IRON SERPL-MCNC: 100 UG/DL (ref 50–170)
LDLC SERPL CALC-MCNC: 70 MG/DL (ref 0–100)
LYMPHOCYTES # BLD AUTO: 1.33 THOUSANDS/ΜL (ref 0.6–4.47)
LYMPHOCYTES NFR BLD AUTO: 29 % (ref 14–44)
MCH RBC QN AUTO: 31.9 PG (ref 26.8–34.3)
MCHC RBC AUTO-ENTMCNC: 32.6 G/DL (ref 31.4–37.4)
MCV RBC AUTO: 98 FL (ref 82–98)
MONOCYTES # BLD AUTO: 0.43 THOUSAND/ΜL (ref 0.17–1.22)
MONOCYTES NFR BLD AUTO: 9 % (ref 4–12)
NEUTROPHILS # BLD AUTO: 2.37 THOUSANDS/ΜL (ref 1.85–7.62)
NEUTS SEG NFR BLD AUTO: 51 % (ref 43–75)
NONHDLC SERPL-MCNC: 83 MG/DL
NRBC BLD AUTO-RTO: 0 /100 WBCS
PLATELET # BLD AUTO: 177 THOUSANDS/UL (ref 149–390)
PMV BLD AUTO: 11.5 FL (ref 8.9–12.7)
POTASSIUM SERPL-SCNC: 4.6 MMOL/L (ref 3.5–5.3)
PROT SERPL-MCNC: 6.9 G/DL (ref 6.4–8.2)
RBC # BLD AUTO: 4.54 MILLION/UL (ref 3.81–5.12)
SODIUM SERPL-SCNC: 133 MMOL/L (ref 136–145)
TIBC SERPL-MCNC: 427 UG/DL (ref 250–450)
TRIGL SERPL-MCNC: 66 MG/DL
TSH SERPL DL<=0.05 MIU/L-ACNC: 0.82 UIU/ML (ref 0.36–3.74)
VIT B12 SERPL-MCNC: 220 PG/ML (ref 100–900)
WBC # BLD AUTO: 4.64 THOUSAND/UL (ref 4.31–10.16)

## 2021-03-11 PROCEDURE — 83550 IRON BINDING TEST: CPT

## 2021-03-11 PROCEDURE — 36415 COLL VENOUS BLD VENIPUNCTURE: CPT

## 2021-03-11 PROCEDURE — 80061 LIPID PANEL: CPT

## 2021-03-11 PROCEDURE — 85025 COMPLETE CBC W/AUTO DIFF WBC: CPT

## 2021-03-11 PROCEDURE — 82746 ASSAY OF FOLIC ACID SERUM: CPT

## 2021-03-11 PROCEDURE — 80053 COMPREHEN METABOLIC PANEL: CPT

## 2021-03-11 PROCEDURE — 84443 ASSAY THYROID STIM HORMONE: CPT

## 2021-03-11 PROCEDURE — 83540 ASSAY OF IRON: CPT

## 2021-03-11 PROCEDURE — 82607 VITAMIN B-12: CPT

## 2021-05-24 ENCOUNTER — OFFICE VISIT (OUTPATIENT)
Dept: URGENT CARE | Facility: CLINIC | Age: 61
End: 2021-05-24
Payer: COMMERCIAL

## 2021-05-24 VITALS
DIASTOLIC BLOOD PRESSURE: 67 MMHG | HEART RATE: 59 BPM | WEIGHT: 140 LBS | RESPIRATION RATE: 18 BRPM | SYSTOLIC BLOOD PRESSURE: 142 MMHG | HEIGHT: 64 IN | OXYGEN SATURATION: 99 % | BODY MASS INDEX: 23.9 KG/M2 | TEMPERATURE: 97.7 F

## 2021-05-24 DIAGNOSIS — M25.421 EFFUSION OF RIGHT ELBOW: Primary | ICD-10-CM

## 2021-05-24 PROCEDURE — 99213 OFFICE O/P EST LOW 20 MIN: CPT | Performed by: PHYSICIAN ASSISTANT

## 2021-05-24 NOTE — PROGRESS NOTES
[facility]        NAME: Trung Medellin is a 62 y o  female  : 1960    MRN: [id number]  DATE: May 24, 2021  TIME: 1:15 PM    Assessment and Plan   Effusion of right elbow [M25 421]  1  Effusion of right elbow  Ambulatory referral to Orthopedic Surgery    Orthopedic injury treatment         Patient Instructions     Avoid placing pressure on elbow  Compression  Ice   Remove wrap every 2-3 hours and stretch  Follow up with ortho  Follow up with PCP in 3-5 days  Proceed to  ER if symptoms worsen  Remove splint/brace/wrap and go straight to ER if you experience sudden increase in pain, swelling, change in color/temperature/sensation  Chief Complaint     Chief Complaint   Patient presents with    Elbow Pain     Right side, swollen, Started a week ago         History of Present Illness       Reports R elbow swelling x 1 week  States it is only painful when she leans on her elbow  Denies fever, chills, discharge, erythema  No pain with movement  Denies injury  Review of Systems   Review of Systems   Constitutional: Negative for chills and fever  Musculoskeletal: Positive for joint swelling  Skin: Negative for color change  Neurological: Negative for weakness and numbness           Current Medications       Current Outpatient Medications:     Brexpiprazole (REXULTI PO), Take by mouth, Disp: , Rfl:     oxyCODONE (ROXICODONE) 5 mg immediate release tablet, Take 1 tablet by mouth 2 (two) times a day, Disp: , Rfl:     traZODone (DESYREL) 150 mg tablet, Take 1 tablet (150 mg total) by mouth daily at bedtime, Disp: 90 tablet, Rfl: 3    alendronate (FOSAMAX) 70 mg tablet, Take 1 tablet (70 mg total) by mouth every 7 days, Disp: 12 tablet, Rfl: 3    atorvastatin (LIPITOR) 10 mg tablet, Take 1 tablet (10 mg total) by mouth daily, Disp: 90 tablet, Rfl: 3    FLUoxetine (PROzac) 20 mg capsule, Take 4 capsules (80 mg total) by mouth daily, Disp: 360 capsule, Rfl: 3    fluticasone (FLONASE) 50 mcg/act nasal spray, 2 sprays into each nostril, Disp: , Rfl:     latanoprost (XALATAN) 0 005 % ophthalmic solution, , Disp: , Rfl:     Current Allergies     Allergies as of 05/24/2021 - Reviewed 05/24/2021   Allergen Reaction Noted    Morphine  05/09/2016    Morphine and related  05/08/2012            The following portions of the patient's history were reviewed and updated as appropriate: allergies, current medications, past family history, past medical history, past social history, past surgical history and problem list      Past Medical History:   Diagnosis Date    Allergic     Anxiety     last assessed 11/17/17    Arthritis     Chronic fatigue syndrome     last assessed 2/25/14    Chronic hepatitis C ([facility] [address] )     last assessed 4/12/17    Depression     Esophageal reflux     last assessed 10/16/13    Fibromyalgia     Gallbladder disease     Glaucoma     Hyperlipidemia     last assessed 4/12/17    Insomnia        Past Surgical History:   Procedure Laterality Date    APPENDECTOMY      resolved 8/2000    CHOLECYSTECTOMY      EAR SURGERY      resolved 4/5/00    GASTRIC BYPASS      resolved 7/06    NEUROPLASTY / TRANSPOSITION MEDIAN NERVE AT CARPAL TUNNEL BILATERAL      resolved 10/11    ROTATOR CUFF REPAIR         Family History   Problem Relation Age of Onset    Arthritis Mother     Coronary artery disease Mother     Mental illness Mother     Heart disease Mother     Hypertension Mother     Mental illness Father     Coronary artery disease Family          Medications have been verified  Objective   /67 (BP Location: Left arm)   Pulse 59   Temp 97 7 °F (36 5 °C)   Resp 18   Ht 5' 4" (1 626 m)   Wt 63 5 kg (140 lb)   SpO2 99%   BMI 24 03 kg/m²   No LMP recorded  Patient is postmenopausal        Physical Exam     Physical Exam  Vitals signs reviewed  Constitutional:       General: She is not in acute distress  Appearance: She is well-developed     Cardiovascular: Rate and Rhythm: Normal rate and regular rhythm  Heart sounds: Normal heart sounds  No murmur  No friction rub  No gallop  Pulmonary:      Effort: Pulmonary effort is normal  No respiratory distress  Breath sounds: Normal breath sounds  No wheezing or rales  Chest:      Chest wall: No tenderness  Musculoskeletal: Normal range of motion  General: Swelling present  No tenderness or deformity  Comments: R elbow effusion without erythema or warmth  Non-tender to palpation  Skin:     General: Skin is warm  Findings: No bruising or erythema  Neurological:      Mental Status: She is alert and oriented to person, place, and time  Deep Tendon Reflexes: Reflexes are normal and symmetric  Psychiatric:         Behavior: Behavior normal          Thought Content: Thought content normal          Judgment: Judgment normal          Orthopedic injury treatment    Date/Time: 5/24/2021 1:15 PM  Performed by: Joella Lesch, PA-C  Authorized by: Joella Lesch, PA-C     Risks and benefits: Risks, benefits and alternatives were discussed    Consent given by:  Patient  Patient identity confirmed:  Verbally with patient  Injury location: R elbow    Neurovascular status: Neurovascularly intact    Distal perfusion: normal    Neurological function: normal    Range of motion: normal    Immobilization:  Ace wrap  Neurovascular status: Neurovascularly intact    Distal perfusion: normal    Neurological function: normal    Range of motion: unchanged    Patient tolerance:  Patient tolerated the procedure well with no immediate complications

## 2021-05-24 NOTE — PATIENT INSTRUCTIONS
Avoid placing pressure on elbow  Compression  Ice   Remove wrap every 2-3 hours and stretch  Follow up with ortho  Follow up with PCP in 3-5 days  Proceed to  ER if symptoms worsen  Remove splint/brace/wrap and go straight to ER if you experience sudden increase in pain, swelling, change in color/temperature/sensation  Elbow Bursitis   WHAT YOU NEED TO KNOW:   Elbow bursitis is inflammation of the bursa in your elbow  The bursa is a fluid-filled sac that acts as a cushion between a bone and a tendon  A tendon is a cord of strong tissue that connects muscles to bones  The bursa is located right under the point of your elbow  DISCHARGE INSTRUCTIONS:   Medicines:   · NSAIDs:  These medicines decrease swelling, pain, and fever  NSAIDs are available without a doctor's order  Ask your healthcare provider which medicine is right for you  Ask how much to take and when to take it  Take as directed  NSAIDs can cause stomach bleeding and kidney problems if not taken correctly  · Antibiotics: These help fight an infection caused by bacteria  You may need antibiotics if your bursitis is caused by infection  · Take your medicine as directed  Contact your healthcare provider if you think your medicine is not helping or if you have side effects  Tell him of her if you are allergic to any medicine  Keep a list of the medicines, vitamins, and herbs you take  Include the amounts, and when and why you take them  Bring the list or the pill bottles to follow-up visits  Carry your medicine list with you in case of an emergency  Manage your symptoms:   · Rest:  Rest your elbow as much as possible to decrease pain and swelling  Slowly start to do more each day  Return to your daily activities as directed  · Ice:  Ice helps decrease swelling and pain  Ice may also help prevent tissue damage  Use an ice pack, or put crushed ice in a plastic bag   Cover it with a towel and place it on your elbow for 15 to 20 minutes, 3 to 4 times each day, as directed  · Compress:  Healthcare providers may wrap your arm with tape or an elastic bandage to decrease swelling  Loosen the elastic bandage if you start to lose feeling in your fingers  · Elevate:  Raise your elbow above the level of your heart as often as you can  This will help decrease swelling and pain  Prop your elbow on pillows or blankets to keep it elevated comfortably  Physical therapy:  A physical therapist teaches you exercises to help improve movement and strength, and to decrease pain  Prevent another elbow injury:   · Avoid injury and pressure to your elbows: Wear elbow pads or protectors when you play sports  Do not lean on your elbows or clench your fists  Do not tightly  small items, such as tools or pens  · Stretch, warm up, and cool down:  Always stretch and do warmup and cool-down exercises before and after you exercise  This will help loosen your muscles and decrease stress on your elbow  Rest between workouts  Follow up with your healthcare provider as directed:  Write down your questions so you remember to ask them during your visits  Contact your healthcare provider if:   · Your pain and swelling increase  · Your symptoms do not improve after 10 days of treatment  · You have a fever  · You have questions or concerns about your condition or care  © Copyright [address] Information is for End User's use only and may not be sold, redistributed or otherwise used for commercial purposes  All illustrations and images included in CareNotes® are the copyrighted property of A D A M , Inc  or [facility] Cynthia Leonardo   The above information is an  only  It is not intended as medical advice for individual conditions or treatments  Talk to your doctor, nurse or pharmacist before following any medical regimen to see if it is safe and effective for you

## 2021-05-28 ENCOUNTER — OFFICE VISIT (OUTPATIENT)
Dept: OBGYN CLINIC | Facility: CLINIC | Age: 61
End: 2021-05-28
Payer: COMMERCIAL

## 2021-05-28 VITALS
HEIGHT: 64 IN | SYSTOLIC BLOOD PRESSURE: 145 MMHG | HEART RATE: 63 BPM | DIASTOLIC BLOOD PRESSURE: 81 MMHG | WEIGHT: 140 LBS | BODY MASS INDEX: 23.9 KG/M2

## 2021-05-28 DIAGNOSIS — M70.21 OLECRANON BURSITIS OF RIGHT ELBOW: ICD-10-CM

## 2021-05-28 PROCEDURE — 99203 OFFICE O/P NEW LOW 30 MIN: CPT | Performed by: ORTHOPAEDIC SURGERY

## 2021-05-28 PROCEDURE — 20605 DRAIN/INJ JOINT/BURSA W/O US: CPT | Performed by: ORTHOPAEDIC SURGERY

## 2021-05-28 NOTE — PROGRESS NOTES
ASSESSMENT/PLAN:    Diagnoses and all orders for this visit:    Olecranon bursitis of right elbow  -     Ambulatory referral to Orthopedic Surgery    Other orders  -     Medium joint arthrocentesis         the patient's right olecranon bursa was able to be aspirated for 5 cc of clear yellow fluid  The patient tolerated the procedure quite well  She was instructed to avoid leaning on her elbow and should use an Ace wrap for compression  She will follow up with our office in 1 month for strength and motion check  The patient is acceptable to this plan  Return in about 1 month (around 6/28/2021)  _____________________________________________________  CHIEF COMPLAINT:  Chief Complaint   Patient presents with    Right Elbow - Pain         SUBJECTIVE:  Gayle Reyes is a 62 y o  female who presents   To our office complaining of right elbow swelling and mild pain  The patient states that she has noticed this swelling for the last 2 weeks  She denies any falls or trauma  She denies any numbness or tingling  She denies any fever or chills  She went to urgent care on 05/24/21       The following portions of the patient's history were reviewed and updated as appropriate: allergies, current medications, past family history, past medical history, past social history, past surgical history and problem list     PAST MEDICAL HISTORY:  Past Medical History:   Diagnosis Date    Allergic     Anxiety     last assessed 11/17/17    Arthritis     Chronic fatigue syndrome     last assessed 2/25/14    Chronic hepatitis C ([facility] [address] )     last assessed 4/12/17    Depression     Esophageal reflux     last assessed 10/16/13    Fibromyalgia     Gallbladder disease     Glaucoma     Hyperlipidemia     last assessed 4/12/17    Insomnia        PAST SURGICAL HISTORY:  Past Surgical History:   Procedure Laterality Date    APPENDECTOMY      resolved 8/2000    CHOLECYSTECTOMY      EAR SURGERY      resolved 4/5/00    GASTRIC BYPASS      resolved 7/06    NEUROPLASTY / TRANSPOSITION MEDIAN NERVE AT CARPAL TUNNEL BILATERAL      resolved 10/11    ROTATOR CUFF REPAIR         FAMILY HISTORY:  Family History   Problem Relation Age of Onset    Arthritis Mother     Coronary artery disease Mother     Mental illness Mother     Heart disease Mother     Hypertension Mother     Mental illness Father     Coronary artery disease Family        SOCIAL HISTORY:  Social History     Tobacco Use    Smoking status: Current Every Day Smoker     Packs/day: 1 50     Years: 40 00     Pack years: 60 00     Types: Cigarettes    Smokeless tobacco: Never Used   Substance Use Topics    Alcohol use: Yes     Alcohol/week: 12 0 standard drinks     Types: 12 Cans of beer per week     Comment: on the weekends    Drug use: No       MEDICATIONS:    Current Outpatient Medications:     alendronate (FOSAMAX) 70 mg tablet, Take 1 tablet (70 mg total) by mouth every 7 days, Disp: 12 tablet, Rfl: 3    atorvastatin (LIPITOR) 10 mg tablet, Take 1 tablet (10 mg total) by mouth daily, Disp: 90 tablet, Rfl: 3    Brexpiprazole (REXULTI PO), Take by mouth, Disp: , Rfl:     FLUoxetine (PROzac) 20 mg capsule, Take 4 capsules (80 mg total) by mouth daily, Disp: 360 capsule, Rfl: 3    fluticasone (FLONASE) 50 mcg/act nasal spray, 2 sprays into each nostril, Disp: , Rfl:     latanoprost (XALATAN) 0 005 % ophthalmic solution, , Disp: , Rfl:     oxyCODONE (ROXICODONE) 5 mg immediate release tablet, Take 1 tablet by mouth 2 (two) times a day, Disp: , Rfl:     traZODone (DESYREL) 150 mg tablet, Take 1 tablet (150 mg total) by mouth daily at bedtime, Disp: 90 tablet, Rfl: 3    ALLERGIES:  Allergies   Allergen Reactions    Morphine     Morphine And Related        ROS:  Review of Systems     Constitutional: Negative for fatigue, fever or loss of appetite  HENT: Negative  Respiratory: Negative for shortness of breath, dyspnea      Cardiovascular: Negative for chest pain/tightness  Gastrointestinal: Negative for abdominal pain, N/V  Endocrine: Negative for cold/heat intolerance, unexplained weight loss/gain  Genitourinary: Negative for flank pain, dysuria, hematuria  Musculoskeletal: Positive for arthralgia and effusion   Skin: Negative for rash  Neurological: Negative for numbness or tingling  Psychiatric/Behavioral: Negative for agitation  _____________________________________________________  PHYSICAL EXAMINATION:    Blood pressure 145/81, pulse 63, height 5' 4" (1 626 m), weight 63 5 kg (140 lb)  Constitutional: Oriented to person, place, and time  Appears well-developed and well-nourished  No distress  HENT:   Head: Normocephalic  Eyes: Conjunctivae are normal  Right eye exhibits no discharge  Left eye exhibits no discharge  No scleral icterus  Cardiovascular: Normal rate  Pulmonary/Chest: Effort normal    Neurological: Alert and oriented to person, place, and time  Skin: Skin is warm and dry  No rash noted  Not diaphoretic  No erythema  No pallor  Psychiatric: Normal mood and affect  Behavior is normal  Judgment and thought content normal       MUSCULOSKELETAL EXAMINATION:   Physical Exam  Ortho Exam      Right upper extremity is neurovascularly intact   Fingers are pink and mobile   Compartments are soft  There is an effusion present along the olecranon bursa   Mild tenderness to palpation   Brisk cap refill   Sensation intact   Range of motion of the elbows intact  No warmth, erythema or ecchymosis  Objective:  BP Readings from Last 1 Encounters:   05/28/21 145/81      Wt Readings from Last 1 Encounters:   05/28/21 63 5 kg (140 lb)        BMI:   Estimated body mass index is 24 03 kg/m² as calculated from the following:    Height as of this encounter: 5' 4" (1 626 m)  Weight as of this encounter: 63 5 kg (140 lb)        PROCEDURES PERFORMED:  Medium joint arthrocentesis: R olecranon bursa  Universal Protocol:  Consent: Verbal consent obtained    Risks and benefits: risks, benefits and alternatives were discussed  Patient understanding: patient states understanding of the procedure being performed    Supporting Documentation  Indications: joint swelling   Procedure Details  Location: elbow - R olecranon bursa  Preparation: Patient was prepped and draped in the usual sterile fashion  Needle size: 18 G  Ultrasound guidance: no  Approach: anterolateral    Aspirate amount: 5 mL  Aspirate: yellow and clear    Patient tolerance: patient tolerated the procedure well with no immediate complications  Dressing:  Sterile dressing applied            Lurdes Levy PA-C

## 2021-06-01 ENCOUNTER — TELEPHONE (OUTPATIENT)
Dept: OBGYN CLINIC | Facility: CLINIC | Age: 61
End: 2021-06-01

## 2021-06-01 ENCOUNTER — TELEPHONE (OUTPATIENT)
Dept: OBGYN CLINIC | Facility: MEDICAL CENTER | Age: 61
End: 2021-06-01

## 2021-06-01 NOTE — TELEPHONE ENCOUNTER
She can come in on Friday  At the earliest   The patient was made aware that the elbow will probably follow up with fluid again

## 2021-06-01 NOTE — TELEPHONE ENCOUNTER
Called pt and left message as we added a forced appt for her to be seen Friday with Dr Hieu Martinez in [place]  Pt needs to be notified as I did leave her a message as per phone call

## 2021-06-01 NOTE — TELEPHONE ENCOUNTER
Patient sees Dr Therese Sutton  Patient is calling due to her tr elbow filling up with fluid  Very painful, can she reschedule for an earlier appointment, she had it drained on  5/28/2021 and was told to wait a month      Please call at [phone]

## 2021-06-01 NOTE — TELEPHONE ENCOUNTER
Called pt to let her know we sceduled her for an appt with Dr Kitty Polanco on Friday in [place]  Pt was added as a forced appt and needs to be notified of the appt time

## 2021-06-04 ENCOUNTER — OFFICE VISIT (OUTPATIENT)
Dept: OBGYN CLINIC | Facility: CLINIC | Age: 61
End: 2021-06-04
Payer: COMMERCIAL

## 2021-06-04 VITALS
WEIGHT: 140 LBS | HEART RATE: 66 BPM | HEIGHT: 64 IN | BODY MASS INDEX: 23.9 KG/M2 | SYSTOLIC BLOOD PRESSURE: 138 MMHG | DIASTOLIC BLOOD PRESSURE: 80 MMHG

## 2021-06-04 DIAGNOSIS — M70.21 OLECRANON BURSITIS OF RIGHT ELBOW: Primary | ICD-10-CM

## 2021-06-04 PROCEDURE — 3008F BODY MASS INDEX DOCD: CPT | Performed by: ORTHOPAEDIC SURGERY

## 2021-06-04 PROCEDURE — 99213 OFFICE O/P EST LOW 20 MIN: CPT | Performed by: ORTHOPAEDIC SURGERY

## 2021-06-04 PROCEDURE — 20605 DRAIN/INJ JOINT/BURSA W/O US: CPT | Performed by: ORTHOPAEDIC SURGERY

## 2021-06-04 NOTE — PROGRESS NOTES
Assessment/Plan:    No problem-specific Assessment & Plan notes found for this encounter  Diagnoses and all orders for this visit:    Olecranon bursitis of right elbow            The right elbow was aspirated of 7 cc of semi bloody fluid  She tolerated procedure quite well  She was instructed to avoid any excessive bleeding  She was also told there is a possibility that this may recur  She was also told that several aspirations may need to be performed  Return back in 1 month for strength and motion check    Subjective:      Patient ID: Jose David Duffy is a 62 y o  female  HPI     the patient has a history of olecranon bursitis her right elbow  She did have an aspiration last week  Unfortunate, the fluid has returned  She desires to have another injection today  She denies any numbness or tingling  She denies any fever or chills  The following portions of the patient's history were reviewed and updated as appropriate: allergies, current medications, past family history, past medical history, past social history, past surgical history and problem list     Review of Systems   Constitutional: Negative for chills, fever and unexpected weight change  HENT: Negative for hearing loss, nosebleeds and sore throat  Eyes: Negative for pain, redness and visual disturbance  Respiratory: Negative for cough, shortness of breath and wheezing  Cardiovascular: Negative for chest pain, palpitations and leg swelling  Gastrointestinal: Negative for abdominal pain, nausea and vomiting  Endocrine: Negative for polydipsia and polyuria  Genitourinary: Negative for dysuria and hematuria  Musculoskeletal: Positive for arthralgias, joint swelling and myalgias  Negative for back pain, gait problem, neck pain and neck stiffness  As noted in HPI   Skin: Negative for rash and wound  Neurological: Negative for dizziness, numbness and headaches     Psychiatric/Behavioral: Negative for decreased concentration and suicidal ideas  The patient is not nervous/anxious  Objective:      /80   Pulse 66   Ht 5' 4" (1 626 m)   Wt 63 5 kg (140 lb)   BMI 24 03 kg/m²          Physical Exam          Neck was soft and supple  There is a negative axial compression test in her neck  Right upper extremity was neurovascular intact  Fingers are pink and mobile  Compartments are soft  The olecranon bursa has returned  There is no warmth erythema  It appears to be less prominent than on last visit  Extensor mechanism intact  Mild pain deep palpation  Medium joint arthrocentesis: R olecranon bursa  Universal Protocol:  Consent: Verbal consent obtained  Risks and benefits: risks, benefits and alternatives were discussed  Consent given by: patient  Time out: Immediately prior to procedure a "time out" was called to verify the correct patient, procedure, equipment, support staff and site/side marked as required  Patient understanding: patient states understanding of the procedure being performed  Test results: test results available and properly labeled  Site marked: the operative site was marked  Radiology Images displayed and confirmed   If images not available, report reviewed: imaging studies available  Patient identity confirmed: verbally with patient    Supporting Documentation  Indications: joint swelling   Procedure Details  Location: elbow - R olecranon bursa  Preparation: Patient was prepped and draped in the usual sterile fashion  Needle size: 18 G  Ultrasound guidance: no  Approach: posterior    Aspirate amount: 7 mL  Aspirate: blood-tinged    Patient tolerance: patient tolerated the procedure well with no immediate complications  Dressing:  Sterile dressing applied

## 2021-07-06 ENCOUNTER — OFFICE VISIT (OUTPATIENT)
Dept: OBGYN CLINIC | Facility: CLINIC | Age: 61
End: 2021-07-06
Payer: COMMERCIAL

## 2021-07-06 VITALS
DIASTOLIC BLOOD PRESSURE: 87 MMHG | HEART RATE: 73 BPM | BODY MASS INDEX: 23.9 KG/M2 | SYSTOLIC BLOOD PRESSURE: 144 MMHG | WEIGHT: 140 LBS | HEIGHT: 64 IN

## 2021-07-06 DIAGNOSIS — M70.21 OLECRANON BURSITIS OF RIGHT ELBOW: Primary | ICD-10-CM

## 2021-07-06 PROCEDURE — 20605 DRAIN/INJ JOINT/BURSA W/O US: CPT | Performed by: ORTHOPAEDIC SURGERY

## 2021-07-06 PROCEDURE — 4004F PT TOBACCO SCREEN RCVD TLK: CPT | Performed by: ORTHOPAEDIC SURGERY

## 2021-07-06 PROCEDURE — 99213 OFFICE O/P EST LOW 20 MIN: CPT | Performed by: ORTHOPAEDIC SURGERY

## 2021-07-06 PROCEDURE — 3008F BODY MASS INDEX DOCD: CPT | Performed by: ORTHOPAEDIC SURGERY

## 2021-07-06 NOTE — PROGRESS NOTES
ASSESSMENT/PLAN:    Diagnoses and all orders for this visit:    Olecranon bursitis of right elbow  -     MRI elbow right wo contrast; Future         the patient's right elbow was aspirated for 22 cc of blood tinged fluid  She tolerated the procedure quite well  We will order an MRI of her right elbow  She will follow up with our office once the MRI is complete  The patient is acceptable to this plan  Return for MRI results  _____________________________________________________  CHIEF COMPLAINT:  Chief Complaint   Patient presents with    Right Elbow - Follow-up         SUBJECTIVE:  Antonietta Murray is a 62 y o  female   With a history of olecranon bursitis of her right elbow  She presents to our office today complaining of right elbow swelling and pain  She has previously had 2 aspirations of her elbow  She would like her elbow aspirated today  She denies any numbness or tingling  She denies any fever or chills      The following portions of the patient's history were reviewed and updated as appropriate: allergies, current medications, past family history, past medical history, past social history, past surgical history and problem list     PAST MEDICAL HISTORY:  Past Medical History:   Diagnosis Date    Allergic     Anxiety     last assessed 11/17/17    Arthritis     Chronic fatigue syndrome     last assessed 2/25/14    Chronic hepatitis C ([facility] [address] )     last assessed 4/12/17    Depression     Esophageal reflux     last assessed 10/16/13    Fibromyalgia     Gallbladder disease     Glaucoma     Hyperlipidemia     last assessed 4/12/17    Insomnia        PAST SURGICAL HISTORY:  Past Surgical History:   Procedure Laterality Date    APPENDECTOMY      resolved 8/2000    CHOLECYSTECTOMY      EAR SURGERY      resolved 4/5/00    GASTRIC BYPASS      resolved 7/06    NEUROPLASTY / TRANSPOSITION MEDIAN NERVE AT CARPAL TUNNEL BILATERAL      resolved 10/11    ROTATOR CUFF REPAIR         FAMILY HISTORY:  Family History   Problem Relation Age of Onset    Arthritis Mother     Coronary artery disease Mother     Mental illness Mother     Heart disease Mother     Hypertension Mother     Mental illness Father     Coronary artery disease Family        SOCIAL HISTORY:  Social History     Tobacco Use    Smoking status: Current Every Day Smoker     Packs/day: 1 50     Years: 40 00     Pack years: 60 00     Types: Cigarettes    Smokeless tobacco: Never Used   Vaping Use    Vaping Use: Never used   Substance Use Topics    Alcohol use: Yes     Alcohol/week: 12 0 standard drinks     Types: 12 Cans of beer per week     Comment: on the weekends    Drug use: No       MEDICATIONS:    Current Outpatient Medications:     Brexpiprazole (REXULTI PO), Take by mouth, Disp: , Rfl:     fluticasone (FLONASE) 50 mcg/act nasal spray, 2 sprays into each nostril, Disp: , Rfl:     latanoprost (XALATAN) 0 005 % ophthalmic solution, , Disp: , Rfl:     oxyCODONE (ROXICODONE) 5 mg immediate release tablet, Take 1 tablet by mouth 2 (two) times a day, Disp: , Rfl:     traZODone (DESYREL) 150 mg tablet, Take 1 tablet (150 mg total) by mouth daily at bedtime, Disp: 90 tablet, Rfl: 3    alendronate (FOSAMAX) 70 mg tablet, Take 1 tablet (70 mg total) by mouth every 7 days, Disp: 12 tablet, Rfl: 3    atorvastatin (LIPITOR) 10 mg tablet, Take 1 tablet (10 mg total) by mouth daily, Disp: 90 tablet, Rfl: 3    FLUoxetine (PROzac) 20 mg capsule, Take 4 capsules (80 mg total) by mouth daily, Disp: 360 capsule, Rfl: 3    ALLERGIES:  Allergies   Allergen Reactions    Morphine     Morphine And Related        ROS:  Review of Systems     Constitutional: Negative for fatigue, fever or loss of appetite  HENT: Negative  Respiratory: Negative for shortness of breath, dyspnea  Cardiovascular: Negative for chest pain/tightness  Gastrointestinal: Negative for abdominal pain, N/V     Endocrine: Negative for cold/heat intolerance, unexplained weight loss/gain  Genitourinary: Negative for flank pain, dysuria, hematuria  Musculoskeletal: Positive for arthralgia   Skin: Negative for rash  Neurological: Negative for numbness or tingling  Psychiatric/Behavioral: Negative for agitation  _____________________________________________________  PHYSICAL EXAMINATION:    Blood pressure 144/87, pulse 73, height 5' 4" (1 626 m), weight 63 5 kg (140 lb)  Constitutional: Oriented to person, place, and time  Appears well-developed and well-nourished  No distress  HENT:   Head: Normocephalic  Eyes: Conjunctivae are normal  Right eye exhibits no discharge  Left eye exhibits no discharge  No scleral icterus  Cardiovascular: Normal rate  Pulmonary/Chest: Effort normal    Neurological: Alert and oriented to person, place, and time  Skin: Skin is warm and dry  No rash noted  Not diaphoretic  No erythema  No pallor  Psychiatric: Normal mood and affect  Behavior is normal  Judgment and thought content normal       MUSCULOSKELETAL EXAMINATION:   Physical Exam  Ortho Exam      Right upper extremity is neurovascularly intact   Fingers are pink and mobile   Compartments are soft   Large amount of swelling of the olecranon bursa   Slight tenderness to palpation   Brisk cap refill   Sensation intact     Objective:  BP Readings from Last 1 Encounters:   07/06/21 144/87      Wt Readings from Last 1 Encounters:   07/06/21 63 5 kg (140 lb)        BMI:   Estimated body mass index is 24 03 kg/m² as calculated from the following:    Height as of this encounter: 5' 4" (1 626 m)  Weight as of this encounter: 63 5 kg (140 lb)        PROCEDURES PERFORMED:  Medium joint arthrocentesis: R olecranon bursa  Universal Protocol:  Risks and benefits: risks, benefits and alternatives were discussed  Consent given by: patient  Patient understanding: patient states understanding of the procedure being performed  Site marked: the operative site was marked  Supporting Documentation  Indications: joint swelling   Procedure Details  Location: elbow - R olecranon bursa  Preparation: Patient was prepped and draped in the usual sterile fashion  Needle size: 18 G  Ultrasound guidance: no  Approach: anterolateral    Aspirate amount: 22 mL  Aspirate: blood-tinged    Patient tolerance: patient tolerated the procedure well with no immediate complications  Dressing:  Sterile dressing applied            Shemar Mohan PA-C

## 2021-07-14 ENCOUNTER — HOSPITAL ENCOUNTER (OUTPATIENT)
Dept: MRI IMAGING | Facility: HOSPITAL | Age: 61
Discharge: HOME | End: 2021-07-14
Attending: PHYSICIAN ASSISTANT
Payer: COMMERCIAL

## 2021-07-14 DIAGNOSIS — M70.21 OLECRANON BURSITIS OF RIGHT ELBOW: ICD-10-CM

## 2021-07-14 PROCEDURE — G1004 CDSM NDSC: HCPCS

## 2021-07-14 PROCEDURE — 73221 MRI JOINT UPR EXTREM W/O DYE: CPT

## 2021-07-19 ENCOUNTER — PREP FOR PROCEDURE (OUTPATIENT)
Dept: OBGYN CLINIC | Facility: CLINIC | Age: 61
End: 2021-07-19

## 2021-07-19 ENCOUNTER — OFFICE VISIT (OUTPATIENT)
Dept: OBGYN CLINIC | Facility: CLINIC | Age: 61
End: 2021-07-19
Payer: COMMERCIAL

## 2021-07-19 VITALS
DIASTOLIC BLOOD PRESSURE: 79 MMHG | WEIGHT: 140 LBS | HEART RATE: 60 BPM | SYSTOLIC BLOOD PRESSURE: 161 MMHG | HEIGHT: 64 IN | BODY MASS INDEX: 23.9 KG/M2

## 2021-07-19 DIAGNOSIS — M70.21 OLECRANON BURSITIS OF RIGHT ELBOW: Primary | ICD-10-CM

## 2021-07-19 DIAGNOSIS — Z01.812 PRE-OPERATIVE LABORATORY EXAMINATION: ICD-10-CM

## 2021-07-19 PROCEDURE — 99214 OFFICE O/P EST MOD 30 MIN: CPT | Performed by: ORTHOPAEDIC SURGERY

## 2021-07-19 PROCEDURE — 3008F BODY MASS INDEX DOCD: CPT | Performed by: ORTHOPAEDIC SURGERY

## 2021-07-19 PROCEDURE — 4004F PT TOBACCO SCREEN RCVD TLK: CPT | Performed by: ORTHOPAEDIC SURGERY

## 2021-07-19 RX ORDER — CHLORHEXIDINE GLUCONATE 4 G/100ML
SOLUTION TOPICAL DAILY PRN
Status: CANCELLED | OUTPATIENT
Start: 2021-08-18

## 2021-07-19 RX ORDER — CEFAZOLIN SODIUM 2 G/50ML
2000 SOLUTION INTRAVENOUS ONCE
Status: CANCELLED | OUTPATIENT
Start: 2021-08-18 | End: 2021-07-19

## 2021-07-19 NOTE — PROGRESS NOTES
Assessment/Plan:    No problem-specific Assessment & Plan notes found for this encounter  Diagnoses and all orders for this visit:    Olecranon bursitis of right elbow  -     Case request operating room: [address] [place] [address]; Standing  -     Ambulatory referral to Physical Therapy; Future  -     Ambulatory referral to Occupational Therapy; Future  -     Case request operating room: EXCISION BURSA OLECRANON    Pre-operative laboratory examination  -     Basic metabolic panel; Future  -     CBC and differential; Future  -     Comprehensive metabolic panel; Future  -     EKG 12 lead; Future  -     Ambulatory referral to Physical Therapy; Future  -     Ambulatory referral to Occupational Therapy; Future    Other orders  -     Void on call to OR; Standing  -     Insert peripheral IV; Standing  -     chlorhexidine (HIBICLENS) 4 % topical liquid  -     ceFAZolin (ANCEF) 2,000 mg in dextrose 5 % 100 mL IVPB           the patient has opted for elective excision of the bursa of her right elbow  All risks, complications, and benefits were discussed with the patient in great detail including bleeding, infection, blood clots, pain, stiffness, neurovascular damage, fractures, dislocations, the possibility of loss of life or limb for surgery, heart attack, stroke, etcetera, recurrence of bursa     her surgery scheduled for August 18, 2021    Subjective:      Patient ID: Delanna Bosworth is a 62 y o  female  HPI     the patient has a history of recurrent olecranon bursitis of her right elbow  She recently had an MRI which did confirm the same  She states the elbow did fill with fluid  The pain does affect her lifestyle  She wants to proceed with elective excision of this bursa  Despite activity modification, multiple aspirations, there is still swelling and pain along the posterior aspect of her right elbow  She denies any drainage  She denies any fever or chills      The following portions of the patient's history were reviewed and updated as appropriate: allergies, current medications, past family history, past medical history, past social history, past surgical history and problem list     Review of Systems   Constitutional: Negative for chills, fever and unexpected weight change  HENT: Negative for hearing loss, nosebleeds and sore throat  Eyes: Negative for pain, redness and visual disturbance  Respiratory: Negative for cough, shortness of breath and wheezing  Cardiovascular: Negative for chest pain, palpitations and leg swelling  Gastrointestinal: Negative for abdominal pain, nausea and vomiting  Endocrine: Negative for polydipsia and polyuria  Genitourinary: Negative for dysuria and hematuria  Musculoskeletal: Positive for arthralgias, joint swelling and myalgias  Negative for back pain, gait problem, neck pain and neck stiffness  As noted in HPI   Skin: Negative for rash and wound  Neurological: Negative for dizziness, numbness and headaches  Psychiatric/Behavioral: Negative for decreased concentration and suicidal ideas  The patient is not nervous/anxious  Objective:      /79 Comment: states BP been elevated  Pulse 60   Ht 5' 4" (1 626 m)   Wt 63 5 kg (140 lb)   BMI 24 03 kg/m²          Physical Exam        Right upper extremity is neurovascular intact  Fingers are pink and mobile  Compartments are soft  Range of motion of her elbow was from 5-120 degrees with the fluid in  A large fluid-filled sac is present  No warmth erythema  Extensor mechanism intact    Neurologically intact distally     MRI as above

## 2021-07-26 ENCOUNTER — TELEPHONE (OUTPATIENT)
Dept: OBGYN CLINIC | Facility: HOSPITAL | Age: 61
End: 2021-07-26

## 2021-07-26 NOTE — TELEPHONE ENCOUNTER
Dr Aidan Maldonado    Patient called, needs a cb from [facility] to reschedule her sx      CB # [id number]

## 2021-07-26 NOTE — TELEPHONE ENCOUNTER
Spoke with patient offered 9/08   Pt declined that date   She will stay with the date she had originally 8/18

## 2021-08-04 ENCOUNTER — OFFICE VISIT (OUTPATIENT)
Dept: LAB | Facility: HOSPITAL | Age: 61
End: 2021-08-04
Attending: PHYSICIAN ASSISTANT
Payer: COMMERCIAL

## 2021-08-04 DIAGNOSIS — Z01.812 PRE-OPERATIVE LABORATORY EXAMINATION: ICD-10-CM

## 2021-08-04 LAB
ALBUMIN SERPL BCP-MCNC: 4.3 G/DL (ref 3.5–5.7)
ALP SERPL-CCNC: 64 U/L (ref 55–165)
ALT SERPL W P-5'-P-CCNC: 11 U/L (ref 7–52)
ANION GAP SERPL CALCULATED.3IONS-SCNC: 5 MMOL/L (ref 4–13)
AST SERPL W P-5'-P-CCNC: 18 U/L (ref 13–39)
BASOPHILS # BLD AUTO: 0.1 THOUSANDS/ΜL (ref 0–0.1)
BASOPHILS NFR BLD AUTO: 1 % (ref 0–2)
BILIRUB SERPL-MCNC: 0.4 MG/DL (ref 0.2–1)
BUN SERPL-MCNC: 8 MG/DL (ref 7–25)
CALCIUM SERPL-MCNC: 9.5 MG/DL (ref 8.6–10.5)
CHLORIDE SERPL-SCNC: 99 MMOL/L (ref 98–107)
CO2 SERPL-SCNC: 28 MMOL/L (ref 21–31)
CREAT SERPL-MCNC: 0.71 MG/DL (ref 0.6–1.2)
EOSINOPHIL # BLD AUTO: 0.3 THOUSAND/ΜL (ref 0–0.61)
EOSINOPHIL NFR BLD AUTO: 6 % (ref 0–5)
ERYTHROCYTE [DISTWIDTH] IN BLOOD BY AUTOMATED COUNT: 13.7 % (ref 11.5–14.5)
GFR SERPL CREATININE-BSD FRML MDRD: 93 ML/MIN/1.73SQ M
GLUCOSE SERPL-MCNC: 93 MG/DL (ref 65–99)
HCT VFR BLD AUTO: 44.4 % (ref 42–47)
HGB BLD-MCNC: 14.7 G/DL (ref 12–16)
LYMPHOCYTES # BLD AUTO: 0.8 THOUSANDS/ΜL (ref 0.6–4.47)
LYMPHOCYTES NFR BLD AUTO: 15 % (ref 21–51)
MCH RBC QN AUTO: 31.9 PG (ref 26–34)
MCHC RBC AUTO-ENTMCNC: 33.2 G/DL (ref 31–37)
MCV RBC AUTO: 96 FL (ref 81–99)
MONOCYTES # BLD AUTO: 0.5 THOUSAND/ΜL (ref 0.17–1.22)
MONOCYTES NFR BLD AUTO: 10 % (ref 2–12)
NEUTROPHILS # BLD AUTO: 3.6 THOUSANDS/ΜL (ref 1.4–6.5)
NEUTS SEG NFR BLD AUTO: 68 % (ref 42–75)
PLATELET # BLD AUTO: 212 THOUSANDS/UL (ref 149–390)
PMV BLD AUTO: 8.9 FL (ref 8.6–11.7)
POTASSIUM SERPL-SCNC: 4.2 MMOL/L (ref 3.5–5.5)
PROT SERPL-MCNC: 7 G/DL (ref 6.4–8.9)
RBC # BLD AUTO: 4.62 MILLION/UL (ref 3.9–5.2)
SODIUM SERPL-SCNC: 132 MMOL/L (ref 134–143)
WBC # BLD AUTO: 5.3 THOUSAND/UL (ref 4.8–10.8)

## 2021-08-04 PROCEDURE — 93005 ELECTROCARDIOGRAM TRACING: CPT

## 2021-08-04 PROCEDURE — 85025 COMPLETE CBC W/AUTO DIFF WBC: CPT

## 2021-08-04 PROCEDURE — 80053 COMPREHEN METABOLIC PANEL: CPT

## 2021-08-04 PROCEDURE — 36415 COLL VENOUS BLD VENIPUNCTURE: CPT

## 2021-08-04 RX ORDER — MELOXICAM 7.5 MG/1
TABLET ORAL
COMMUNITY
Start: 2021-06-04

## 2021-08-04 RX ORDER — LEVOTHYROXINE SODIUM 0.05 MG/1
50 TABLET ORAL DAILY
COMMUNITY
Start: 2021-08-02

## 2021-08-04 NOTE — PRE-PROCEDURE INSTRUCTIONS
Pre-Surgery Instructions:   Medication Instructions    alendronate (FOSAMAX) 70 mg tablet Instructed patient per Anesthesia Guidelines   atorvastatin (LIPITOR) 10 mg tablet Instructed patient per Anesthesia Guidelines   Brexpiprazole (REXULTI PO) Instructed patient per Anesthesia Guidelines   FLUoxetine (PROzac) 20 mg capsule Instructed patient per Anesthesia Guidelines   levothyroxine 50 mcg tablet Instructed patient per Anesthesia Guidelines   meloxicam (MOBIC) 7 5 mg tablet Instructed patient per Anesthesia Guidelines   oxyCODONE (ROXICODONE) 5 mg immediate release tablet Instructed patient per Anesthesia Guidelines   traZODone (DESYREL) 150 mg tablet Instructed patient per Anesthesia Guidelines  All pre-op instructions as per [facility] My [facility] Experience w/ pt verb understanding  Inst NPO post MN night before sx as per [facility] My [facility] Experience w/ pt verb understanding  Inst ok to take her a m  meds as she typically does except for her mobic  Stats she will be taking her levothyroxine, prozac, rexulti, lipitor & oxycodone that morning  Inst ok to take her trazadone as she typically does night before procedure  Instructed to avoid all ASA and OTC Vit/Supp 1 week prior to surgery and to avoid NSAIDs 3 days prior to surgery  Tylenol ok to take prn  Received pre-op showering instructions from surgeon office, has CHG, reviewed process at time of call  Current hospital visitor & masking policy reviewed  Pt verbalized an understanding of all instructions reviewed and offers no concerns at this time

## 2021-08-05 LAB
ATRIAL RATE: 70 BPM
P AXIS: 40 DEGREES
PR INTERVAL: 176 MS
QRS AXIS: 57 DEGREES
QRSD INTERVAL: 84 MS
QT INTERVAL: 420 MS
QTC INTERVAL: 453 MS
T WAVE AXIS: 57 DEGREES
VENTRICULAR RATE: 70 BPM

## 2021-08-05 PROCEDURE — 93010 ELECTROCARDIOGRAM REPORT: CPT | Performed by: INTERNAL MEDICINE

## 2021-08-18 ENCOUNTER — ANESTHESIA (OUTPATIENT)
Dept: PERIOP | Facility: HOSPITAL | Age: 61
End: 2021-08-18
Payer: COMMERCIAL

## 2021-08-18 ENCOUNTER — TELEPHONE (OUTPATIENT)
Dept: OBGYN CLINIC | Facility: HOSPITAL | Age: 61
End: 2021-08-18

## 2021-08-18 ENCOUNTER — HOSPITAL ENCOUNTER (OUTPATIENT)
Facility: HOSPITAL | Age: 61
Setting detail: OUTPATIENT SURGERY
Discharge: HOME | End: 2021-08-18
Attending: ORTHOPAEDIC SURGERY | Admitting: ORTHOPAEDIC SURGERY
Payer: COMMERCIAL

## 2021-08-18 ENCOUNTER — ANESTHESIA EVENT (OUTPATIENT)
Dept: PERIOP | Facility: HOSPITAL | Age: 61
End: 2021-08-18
Payer: COMMERCIAL

## 2021-08-18 VITALS
HEART RATE: 70 BPM | DIASTOLIC BLOOD PRESSURE: 70 MMHG | OXYGEN SATURATION: 95 % | RESPIRATION RATE: 20 BRPM | BODY MASS INDEX: 23.9 KG/M2 | SYSTOLIC BLOOD PRESSURE: 132 MMHG | HEIGHT: 64 IN | TEMPERATURE: 98.5 F | WEIGHT: 140 LBS

## 2021-08-18 DIAGNOSIS — M70.21 OLECRANON BURSITIS OF RIGHT ELBOW: ICD-10-CM

## 2021-08-18 PROCEDURE — 24105 EXCISION OLECRANON BURSA: CPT | Performed by: PHYSICIAN ASSISTANT

## 2021-08-18 PROCEDURE — 24105 EXCISION OLECRANON BURSA: CPT | Performed by: ORTHOPAEDIC SURGERY

## 2021-08-18 PROCEDURE — 88304 TISSUE EXAM BY PATHOLOGIST: CPT | Performed by: PATHOLOGY

## 2021-08-18 PROCEDURE — 99024 POSTOP FOLLOW-UP VISIT: CPT | Performed by: PHYSICIAN ASSISTANT

## 2021-08-18 RX ORDER — POVIDONE-IODINE 10 MG/G
OINTMENT TOPICAL AS NEEDED
Status: DISCONTINUED | OUTPATIENT
Start: 2021-08-18 | End: 2021-08-18 | Stop reason: HOSPADM

## 2021-08-18 RX ORDER — ONDANSETRON 2 MG/ML
4 INJECTION INTRAMUSCULAR; INTRAVENOUS EVERY 6 HOURS PRN
Status: DISCONTINUED | OUTPATIENT
Start: 2021-08-18 | End: 2021-08-18 | Stop reason: HOSPADM

## 2021-08-18 RX ORDER — FENTANYL CITRATE 50 UG/ML
INJECTION, SOLUTION INTRAMUSCULAR; INTRAVENOUS AS NEEDED
Status: DISCONTINUED | OUTPATIENT
Start: 2021-08-18 | End: 2021-08-18

## 2021-08-18 RX ORDER — ONDANSETRON 2 MG/ML
INJECTION INTRAMUSCULAR; INTRAVENOUS AS NEEDED
Status: DISCONTINUED | OUTPATIENT
Start: 2021-08-18 | End: 2021-08-18

## 2021-08-18 RX ORDER — CHLORHEXIDINE GLUCONATE 4 G/100ML
SOLUTION TOPICAL DAILY PRN
Status: DISCONTINUED | OUTPATIENT
Start: 2021-08-18 | End: 2021-08-18 | Stop reason: HOSPADM

## 2021-08-18 RX ORDER — OXYCODONE HYDROCHLORIDE AND ACETAMINOPHEN 5; 325 MG/1; MG/1
2 TABLET ORAL EVERY 4 HOURS PRN
Status: DISCONTINUED | OUTPATIENT
Start: 2021-08-18 | End: 2021-08-18 | Stop reason: HOSPADM

## 2021-08-18 RX ORDER — SODIUM CHLORIDE, SODIUM LACTATE, POTASSIUM CHLORIDE, CALCIUM CHLORIDE 600; 310; 30; 20 MG/100ML; MG/100ML; MG/100ML; MG/100ML
125 INJECTION, SOLUTION INTRAVENOUS CONTINUOUS
Status: DISCONTINUED | OUTPATIENT
Start: 2021-08-18 | End: 2021-08-18 | Stop reason: HOSPADM

## 2021-08-18 RX ORDER — OXYCODONE AND ACETAMINOPHEN 10; 325 MG/1; MG/1
1 TABLET ORAL EVERY 4 HOURS PRN
Qty: 20 TABLET | Refills: 0 | Status: SHIPPED | OUTPATIENT
Start: 2021-08-18 | End: 2021-08-28

## 2021-08-18 RX ORDER — FENTANYL CITRATE/PF 50 MCG/ML
25 SYRINGE (ML) INJECTION
Status: DISCONTINUED | OUTPATIENT
Start: 2021-08-18 | End: 2021-08-18 | Stop reason: HOSPADM

## 2021-08-18 RX ORDER — CEPHALEXIN 500 MG/1
500 CAPSULE ORAL EVERY 8 HOURS SCHEDULED
Qty: 15 CAPSULE | Refills: 0 | Status: SHIPPED | OUTPATIENT
Start: 2021-08-18 | End: 2021-08-23

## 2021-08-18 RX ORDER — ONDANSETRON 2 MG/ML
4 INJECTION INTRAMUSCULAR; INTRAVENOUS ONCE AS NEEDED
Status: DISCONTINUED | OUTPATIENT
Start: 2021-08-18 | End: 2021-08-18 | Stop reason: HOSPADM

## 2021-08-18 RX ORDER — DEXAMETHASONE SODIUM PHOSPHATE 10 MG/ML
INJECTION, SOLUTION INTRAMUSCULAR; INTRAVENOUS AS NEEDED
Status: DISCONTINUED | OUTPATIENT
Start: 2021-08-18 | End: 2021-08-18

## 2021-08-18 RX ORDER — LIDOCAINE HYDROCHLORIDE 10 MG/ML
0.5 INJECTION, SOLUTION EPIDURAL; INFILTRATION; INTRACAUDAL; PERINEURAL ONCE AS NEEDED
Status: DISCONTINUED | OUTPATIENT
Start: 2021-08-18 | End: 2021-08-18 | Stop reason: HOSPADM

## 2021-08-18 RX ORDER — CEFAZOLIN SODIUM 2 G/50ML
2000 SOLUTION INTRAVENOUS ONCE
Status: COMPLETED | OUTPATIENT
Start: 2021-08-18 | End: 2021-08-18

## 2021-08-18 RX ORDER — PROPOFOL 10 MG/ML
INJECTION, EMULSION INTRAVENOUS AS NEEDED
Status: DISCONTINUED | OUTPATIENT
Start: 2021-08-18 | End: 2021-08-18

## 2021-08-18 RX ORDER — BUPIVACAINE HYDROCHLORIDE 5 MG/ML
INJECTION, SOLUTION PERINEURAL AS NEEDED
Status: DISCONTINUED | OUTPATIENT
Start: 2021-08-18 | End: 2021-08-18 | Stop reason: HOSPADM

## 2021-08-18 RX ORDER — MIDAZOLAM HYDROCHLORIDE 2 MG/2ML
INJECTION, SOLUTION INTRAMUSCULAR; INTRAVENOUS AS NEEDED
Status: DISCONTINUED | OUTPATIENT
Start: 2021-08-18 | End: 2021-08-18

## 2021-08-18 RX ORDER — LIDOCAINE HYDROCHLORIDE 10 MG/ML
INJECTION, SOLUTION EPIDURAL; INFILTRATION; INTRACAUDAL; PERINEURAL AS NEEDED
Status: DISCONTINUED | OUTPATIENT
Start: 2021-08-18 | End: 2021-08-18

## 2021-08-18 RX ADMIN — FENTANYL CITRATE 50 MCG: 50 INJECTION INTRAMUSCULAR; INTRAVENOUS at 11:58

## 2021-08-18 RX ADMIN — MIDAZOLAM HYDROCHLORIDE 2 MG: 1 INJECTION, SOLUTION INTRAMUSCULAR; INTRAVENOUS at 11:21

## 2021-08-18 RX ADMIN — ONDANSETRON 4 MG: 2 INJECTION INTRAMUSCULAR; INTRAVENOUS at 11:34

## 2021-08-18 RX ADMIN — FENTANYL CITRATE 50 MCG: 50 INJECTION INTRAMUSCULAR; INTRAVENOUS at 12:21

## 2021-08-18 RX ADMIN — CEFAZOLIN SODIUM 2000 MG: 2 SOLUTION INTRAVENOUS at 11:20

## 2021-08-18 RX ADMIN — SODIUM CHLORIDE, SODIUM LACTATE, POTASSIUM CHLORIDE, AND CALCIUM CHLORIDE 125 ML/HR: .6; .31; .03; .02 INJECTION, SOLUTION INTRAVENOUS at 11:06

## 2021-08-18 RX ADMIN — DEXAMETHASONE SODIUM PHOSPHATE 4 MG: 10 INJECTION, SOLUTION INTRAMUSCULAR; INTRAVENOUS at 11:34

## 2021-08-18 RX ADMIN — OXYCODONE HYDROCHLORIDE AND ACETAMINOPHEN 2 TABLET: 5; 325 TABLET ORAL at 13:29

## 2021-08-18 RX ADMIN — LIDOCAINE HYDROCHLORIDE 50 MG: 10 INJECTION, SOLUTION EPIDURAL; INFILTRATION; INTRACAUDAL; PERINEURAL at 11:22

## 2021-08-18 RX ADMIN — PROPOFOL 150 MG: 10 INJECTION, EMULSION INTRAVENOUS at 11:22

## 2021-08-18 RX ADMIN — FENTANYL CITRATE 100 MCG: 50 INJECTION INTRAMUSCULAR; INTRAVENOUS at 11:21

## 2021-08-18 NOTE — TELEPHONE ENCOUNTER
Dr Johnathon Coello    2 scripts for Percocet were written for patient  today  Armand  from [facility] DR DENA MCCLAIN said the  is a very high dose  Where was the 5/325 script sent? Armand doesn't see it sent to [facility]  Also, the patient is on Percocet already, prescribed by [facility]  Twice a day and filled 7/24  Please fu with Pharmacy for clarification on dosage       Armand  # [phone]

## 2021-08-18 NOTE — TELEPHONE ENCOUNTER
Spoke to Bert  He stated that he cannot fill the script as it is  He stated we need to change to 5/325 mg percocet 1 tablet Q4h or 10/325 mg percocet 1 tablet Q6h  Please change the script as recommended above or confirm that 10/325 mg tablets Q4h is written is correct this is needed as is with reasoning and he will document and fill as advised

## 2021-08-18 NOTE — OP NOTE
OPERATIVE REPORT  PATIENT NAME: Dennie Spar    :  1960  MRN: [id number]  Pt Location: [address] [place] [address] OR ROOM 03    SURGERY DATE: 2021    Surgeon(s) and Role:     * Maci Denny DO - Primary     * Sarita Vaz PA-C - Assisting    Preop Diagnosis:  Olecranon bursitis of right elbow [M70 21]    Post-Op Diagnosis Codes:     * Olecranon bursitis of right elbow [M70 21]    Procedure(s) (LRB):  EXCISION BURSA OLECRANON (Right)    Specimen(s):  Bursal sac    Estimated Blood Loss:   Minimal    Drains:  None    Anesthesia Type:   LMA with local placed at conclusion of procedure    Operative Indications:  Olecranon bursitis of right elbow [M70 21]      Operative Findings:  TT: 36    Complications:   None    Procedure and Technique:    The patient was properly identified and brought into the operating suite  After successful induction of the anesthetic, a tourniquet was placed on the patient's right proximal arm  The right upper extremity was then prepped and draped in usual sterile fashion    It was medically necessary that the physician assistant be in the room to aid in positioning and applying the appropriate amount of retraction on the patient  A qualified resident was not available  The physician assistant's role was very integral to the success of this case  He assisted on positioning, retraction of soft tissue, retraction of the bursal sac, and closure of a complex wound  The patient was given a dose of intravenous antibiotics    The surgical landmarks were outlined a skin marker  An Esmarch was uses a rate the right upper extremity  The tourniquet was then inflated  Using a #15 Scalpel blade, a longitudinal incision was made along the posterior aspect of her right elbow directly over the bursal sac  Hemostasis was achieved with the use of electrocautery  This layers for struck down to subcutaneous fat layered  For further dissection the bursal sac was meticulously dissected    Once the bursal sac was completely removed  The elbow was explored without any evidence of any osteophytes  The wound was then irrigated with sterile antibiotic solution  The redundant skin was a look the sized out  The incision was then closed with 3-0 Vicryl and 4-0 nylon  The region was infiltrated with 0 5% Marcaine with epinephrine and dressed with ointment, Xeroform, 4x4s, still rubber will an Ace bandage  There is no complications during the procedure    She was successfully awoken, transferred to her hospital bed, and went the recovery room in stable condition     I was present for the entire procedure, A qualified resident physician was not available and A physician assistant was required during the procedure for retraction tissue handling,dissection and suturing    Patient Disposition:  PACU     SIGNATURE: Kai Good DO  DATE: August 18, 2021  TIME: 11:20 AM

## 2021-08-18 NOTE — H&P
H&P reviewed   After examining the patient I find no changes in the patients condition since the H&P had been written        Visit Vitals  /58   Pulse 64   Temp 97 8 °F (36 6 °C) (Temporal)   Resp 18   Ht 5' 4" (1 626 m)   Wt 63 5 kg (140 lb)   SpO2 95%   BMI 24 03 kg/m²   OB Status Postmenopausal   Smoking Status Current Every Day Smoker   BSA 1 68 m²

## 2021-08-18 NOTE — ANESTHESIA PREPROCEDURE EVALUATION
Procedure:  EXCISION BURSA OLECRANON (Right Elbow)    Relevant Problems   CARDIO   (+) Hyperlipidemia      MUSCULOSKELETAL   (+) Arthritis      NEURO/PSYCH   (+) Severe episode of recurrent major depressive disorder, without psychotic features ([facility] [address] )      Other   (+) Alcoholism in recovery (HCC)   (+) Tobacco abuse        Physical Exam    Airway    Mallampati score: II  TM Distance: >3 FB  Neck ROM: full     Dental       Cardiovascular      Pulmonary      Other Findings        Anesthesia Plan  ASA Score- 2     Anesthesia Type- general with ASA Monitors  Additional Monitors:   Airway Plan: LMA  Plan Factors-Exercise tolerance (METS): >4 METS  Chart reviewed  Existing labs reviewed  Induction- intravenous  Postoperative Plan- Plan for postoperative opioid use  Planned trial extubation    Informed Consent- Anesthetic plan and risks discussed with patient  I personally reviewed this patient with the CRNA  Discussed and agreed on the Anesthesia Plan with the CRNA  Kailey Reynolds

## 2021-08-31 ENCOUNTER — OFFICE VISIT (OUTPATIENT)
Dept: OBGYN CLINIC | Facility: CLINIC | Age: 61
End: 2021-08-31

## 2021-08-31 VITALS
HEART RATE: 72 BPM | SYSTOLIC BLOOD PRESSURE: 136 MMHG | WEIGHT: 140 LBS | DIASTOLIC BLOOD PRESSURE: 78 MMHG | BODY MASS INDEX: 23.9 KG/M2 | HEIGHT: 64 IN

## 2021-08-31 DIAGNOSIS — M70.21 OLECRANON BURSITIS OF RIGHT ELBOW: Primary | ICD-10-CM

## 2021-08-31 PROCEDURE — 99024 POSTOP FOLLOW-UP VISIT: CPT | Performed by: ORTHOPAEDIC SURGERY

## 2021-08-31 PROCEDURE — 3008F BODY MASS INDEX DOCD: CPT | Performed by: ORTHOPAEDIC SURGERY

## 2021-08-31 NOTE — PROGRESS NOTES
ASSESSMENT/PLAN:    Diagnoses and all orders for this visit:    Olecranon bursitis of right elbow         the patient is continuing to do well since surgery  Her incision is clean, dry and intact  She can perform a home exercise program for strengthening, stretching and range of motion  She will follow up with our office in 4 weeks  The patient is acceptable to this plan  Return in about 1 month (around 9/30/2021)  _____________________________________________________  CHIEF COMPLAINT:  Chief Complaint   Patient presents with    Right Elbow - Post-op         SUBJECTIVE:  Macario Berg is a 62 y o  female   Status post excision of her right olecranon bursa from 08/18/2021  She states she is doing well since surgery  She denies any significant pain  She denies any numbness or tingling  She denies any fever or chills  She is very pleased with the results from surgery      The following portions of the patient's history were reviewed and updated as appropriate: allergies, current medications, past family history, past medical history, past social history, past surgical history and problem list     PAST MEDICAL HISTORY:  Past Medical History:   Diagnosis Date    Allergic     Anxiety     last assessed 11/17/17    Arthritis     Chronic fatigue syndrome     last assessed 2/25/14    Chronic hepatitis C ([facility] [address] )     last assessed 4/12/17    Depression     Disease of thyroid gland     hypothyroid    Esophageal reflux     last assessed 10/16/13    Fibromyalgia     Gallbladder disease     Glaucoma     Hyperlipidemia     last assessed 4/12/17    Infectious viral hepatitis     chronic Hep-C    Insomnia     Insomnia     Seasonal allergies        PAST SURGICAL HISTORY:  Past Surgical History:   Procedure Laterality Date    APPENDECTOMY      resolved 8/2000    CARPAL TUNNEL RELEASE      CHOLECYSTECTOMY      COLONOSCOPY      EAR SURGERY      resolved 4/5/00    GASTRIC BYPASS      resolved 7/06    NEUROPLASTY / TRANSPOSITION MEDIAN NERVE AT CARPAL TUNNEL BILATERAL      resolved 10/11    ROTATOR CUFF REPAIR Right        FAMILY HISTORY:  Family History   Problem Relation Age of Onset    Arthritis Mother     Coronary artery disease Mother     Mental illness Mother     Heart disease Mother     Hypertension Mother     Mental illness Father     Coronary artery disease Family        SOCIAL HISTORY:  Social History     Tobacco Use    Smoking status: Current Every Day Smoker     Packs/day: 1 50     Years: 40 00     Pack years: 60 00     Types: Cigarettes    Smokeless tobacco: Never Used   Vaping Use    Vaping Use: Never used   Substance Use Topics    Alcohol use: Yes     Alcohol/week: 12 0 standard drinks     Types: 12 Cans of beer per week     Comment: on the weekends    Drug use: No       MEDICATIONS:    Current Outpatient Medications:     Brexpiprazole (REXULTI PO), Take by mouth, Disp: , Rfl:     levothyroxine 50 mcg tablet, Take 50 mcg by mouth daily, Disp: , Rfl:     meloxicam (MOBIC) 7 5 mg tablet, TAKE 1 OR 2 TABLETS DAILY WITH FOOD, Disp: , Rfl:     traZODone (DESYREL) 150 mg tablet, Take 1 tablet (150 mg total) by mouth daily at bedtime, Disp: 90 tablet, Rfl: 3    alendronate (FOSAMAX) 70 mg tablet, Take 1 tablet (70 mg total) by mouth every 7 days, Disp: 12 tablet, Rfl: 3    atorvastatin (LIPITOR) 10 mg tablet, Take 1 tablet (10 mg total) by mouth daily, Disp: 90 tablet, Rfl: 3    FLUoxetine (PROzac) 20 mg capsule, Take 4 capsules (80 mg total) by mouth daily, Disp: 360 capsule, Rfl: 3    ALLERGIES:  Allergies   Allergen Reactions    Morphine Other (See Comments)     States feeling like crawling out of her skin,     Morphine And Related        ROS:  Review of Systems     Constitutional: Negative for fatigue, fever or loss of appetite  HENT: Negative  Respiratory: Negative for shortness of breath, dyspnea  Cardiovascular: Negative for chest pain/tightness     Gastrointestinal: Negative for abdominal pain, N/V  Endocrine: Negative for cold/heat intolerance, unexplained weight loss/gain  Genitourinary: Negative for flank pain, dysuria, hematuria  Musculoskeletal:   Negative for arthralgia   Skin: Negative for rash  Neurological: Negative for numbness or tingling  Psychiatric/Behavioral: Negative for agitation  _____________________________________________________  PHYSICAL EXAMINATION:    Blood pressure 136/78, pulse 72, height 5' 4" (1 626 m), weight 63 5 kg (140 lb)  Constitutional: Oriented to person, place, and time  Appears well-developed and well-nourished  No distress  HENT:   Head: Normocephalic  Eyes: Conjunctivae are normal  Right eye exhibits no discharge  Left eye exhibits no discharge  No scleral icterus  Cardiovascular: Normal rate  Pulmonary/Chest: Effort normal    Neurological: Alert and oriented to person, place, and time  Skin: Skin is warm and dry  No rash noted  Not diaphoretic  No erythema  No pallor  Psychiatric: Normal mood and affect  Behavior is normal  Judgment and thought content normal       MUSCULOSKELETAL EXAMINATION:   Physical Exam  Ortho Exam     right upper extremity is neurovascularly intact  Fingers are pink and mobile   Compartments are soft   Incision is clean, dry and intact   Good range of motion of the elbow  No tenderness to palpation   No warmth, erythema or ecchymosis present   No effusion present   Brisk cap refill   Sensation intact  Objective:  BP Readings from Last 1 Encounters:   08/31/21 136/78      Wt Readings from Last 1 Encounters:   08/31/21 63 5 kg (140 lb)        BMI:   Estimated body mass index is 24 03 kg/m² as calculated from the following:    Height as of this encounter: 5' 4" (1 626 m)  Weight as of this encounter: 63 5 kg (140 lb)            Annie Zepeda PA-C

## 2021-09-28 ENCOUNTER — HOSPITAL ENCOUNTER (OUTPATIENT)
Dept: CT IMAGING | Facility: HOSPITAL | Age: 61
Discharge: HOME | End: 2021-09-28
Attending: INTERNAL MEDICINE
Payer: COMMERCIAL

## 2021-09-28 DIAGNOSIS — F17.200 NICOTINE DEPENDENCE, UNSPECIFIED, UNCOMPLICATED: ICD-10-CM

## 2021-09-28 PROCEDURE — 71271 CT THORAX LUNG CANCER SCR C-: CPT

## 2021-12-01 ENCOUNTER — APPOINTMENT (OUTPATIENT)
Dept: LAB | Facility: CLINIC | Age: 61
End: 2021-12-01
Payer: COMMERCIAL

## 2021-12-01 DIAGNOSIS — E78.2 MIXED HYPERLIPIDEMIA: ICD-10-CM

## 2021-12-01 LAB
ANION GAP SERPL CALCULATED.3IONS-SCNC: 6 MMOL/L (ref 4–13)
AST SERPL W P-5'-P-CCNC: 28 U/L (ref 5–45)
BUN SERPL-MCNC: 2 MG/DL (ref 5–25)
CALCIUM SERPL-MCNC: 8.8 MG/DL (ref 8.3–10.1)
CHLORIDE SERPL-SCNC: 99 MMOL/L (ref 100–108)
CO2 SERPL-SCNC: 25 MMOL/L (ref 21–32)
CREAT SERPL-MCNC: 0.68 MG/DL (ref 0.6–1.3)
GFR SERPL CREATININE-BSD FRML MDRD: 95 ML/MIN/1.73SQ M
GLUCOSE P FAST SERPL-MCNC: 95 MG/DL (ref 65–99)
LDLC SERPL DIRECT ASSAY-MCNC: 56 MG/DL (ref 0–100)
POTASSIUM SERPL-SCNC: 4.1 MMOL/L (ref 3.5–5.3)
SODIUM SERPL-SCNC: 130 MMOL/L (ref 136–145)

## 2021-12-01 PROCEDURE — 83721 ASSAY OF BLOOD LIPOPROTEIN: CPT

## 2021-12-01 PROCEDURE — 80048 BASIC METABOLIC PNL TOTAL CA: CPT

## 2021-12-01 PROCEDURE — 36415 COLL VENOUS BLD VENIPUNCTURE: CPT

## 2021-12-01 PROCEDURE — 84450 TRANSFERASE (AST) (SGOT): CPT

## 2022-05-04 ENCOUNTER — APPOINTMENT (OUTPATIENT)
Dept: LAB | Facility: CLINIC | Age: 62
End: 2022-05-04
Payer: COMMERCIAL

## 2022-05-04 DIAGNOSIS — E78.5 HYPERLIPIDEMIA, UNSPECIFIED HYPERLIPIDEMIA TYPE: ICD-10-CM

## 2022-05-04 DIAGNOSIS — E03.9 HYPOTHYROIDISM, UNSPECIFIED TYPE: ICD-10-CM

## 2022-05-04 DIAGNOSIS — I10 HYPERTENSION, UNSPECIFIED TYPE: ICD-10-CM

## 2022-05-04 LAB
ALBUMIN SERPL BCP-MCNC: 3.8 G/DL (ref 3.5–5)
ALP SERPL-CCNC: 71 U/L (ref 46–116)
ALT SERPL W P-5'-P-CCNC: 36 U/L (ref 12–78)
ANION GAP SERPL CALCULATED.3IONS-SCNC: 5 MMOL/L (ref 4–13)
AST SERPL W P-5'-P-CCNC: 38 U/L (ref 5–45)
BILIRUB DIRECT SERPL-MCNC: 0.13 MG/DL (ref 0–0.2)
BILIRUB SERPL-MCNC: 0.39 MG/DL (ref 0.2–1)
BUN SERPL-MCNC: 3 MG/DL (ref 5–25)
CALCIUM SERPL-MCNC: 9.1 MG/DL (ref 8.3–10.1)
CHLORIDE SERPL-SCNC: 101 MMOL/L (ref 100–108)
CHOLEST SERPL-MCNC: 151 MG/DL
CO2 SERPL-SCNC: 28 MMOL/L (ref 21–32)
CREAT SERPL-MCNC: 0.69 MG/DL (ref 0.6–1.3)
ERYTHROCYTE [DISTWIDTH] IN BLOOD BY AUTOMATED COUNT: 14.3 % (ref 11.6–15.1)
GFR SERPL CREATININE-BSD FRML MDRD: 94 ML/MIN/1.73SQ M
GLUCOSE P FAST SERPL-MCNC: 87 MG/DL (ref 65–99)
HCT VFR BLD AUTO: 43.7 % (ref 34.8–46.1)
HDLC SERPL-MCNC: 86 MG/DL
HGB BLD-MCNC: 14.9 G/DL (ref 11.5–15.4)
LDLC SERPL CALC-MCNC: 49 MG/DL (ref 0–100)
MCH RBC QN AUTO: 33.3 PG (ref 26.8–34.3)
MCHC RBC AUTO-ENTMCNC: 34.1 G/DL (ref 31.4–37.4)
MCV RBC AUTO: 98 FL (ref 82–98)
NONHDLC SERPL-MCNC: 65 MG/DL
PLATELET # BLD AUTO: 210 THOUSANDS/UL (ref 149–390)
PMV BLD AUTO: 10.8 FL (ref 8.9–12.7)
POTASSIUM SERPL-SCNC: 4.3 MMOL/L (ref 3.5–5.3)
PROT SERPL-MCNC: 7.1 G/DL (ref 6.4–8.2)
RBC # BLD AUTO: 4.48 MILLION/UL (ref 3.81–5.12)
SODIUM SERPL-SCNC: 134 MMOL/L (ref 136–145)
T4 SERPL-MCNC: 7.7 UG/DL (ref 4.7–13.3)
TRIGL SERPL-MCNC: 82 MG/DL
TSH SERPL DL<=0.05 MIU/L-ACNC: 0.69 UIU/ML (ref 0.45–4.5)
WBC # BLD AUTO: 5.41 THOUSAND/UL (ref 4.31–10.16)

## 2022-05-04 PROCEDURE — 80061 LIPID PANEL: CPT

## 2022-05-04 PROCEDURE — 80076 HEPATIC FUNCTION PANEL: CPT

## 2022-05-04 PROCEDURE — 85027 COMPLETE CBC AUTOMATED: CPT

## 2022-05-04 PROCEDURE — 80048 BASIC METABOLIC PNL TOTAL CA: CPT

## 2022-05-04 PROCEDURE — 84436 ASSAY OF TOTAL THYROXINE: CPT

## 2022-05-04 PROCEDURE — 84443 ASSAY THYROID STIM HORMONE: CPT

## 2022-05-04 PROCEDURE — 36415 COLL VENOUS BLD VENIPUNCTURE: CPT

## 2022-05-25 ENCOUNTER — TELEPHONE (OUTPATIENT)
Dept: OBGYN CLINIC | Facility: HOSPITAL | Age: 62
End: 2022-05-25

## 2022-11-03 ENCOUNTER — APPOINTMENT (OUTPATIENT)
Dept: LAB | Facility: CLINIC | Age: 62
End: 2022-11-03

## 2022-11-03 DIAGNOSIS — I10 HYPERTENSION, UNSPECIFIED TYPE: ICD-10-CM

## 2022-11-03 DIAGNOSIS — E78.2 MIXED HYPERLIPIDEMIA: ICD-10-CM

## 2022-11-03 LAB
ANION GAP SERPL CALCULATED.3IONS-SCNC: 7 MMOL/L (ref 4–13)
AST SERPL W P-5'-P-CCNC: 34 U/L (ref 5–45)
BUN SERPL-MCNC: 5 MG/DL (ref 5–25)
CALCIUM SERPL-MCNC: 9.7 MG/DL (ref 8.3–10.1)
CHLORIDE SERPL-SCNC: 96 MMOL/L (ref 96–108)
CO2 SERPL-SCNC: 26 MMOL/L (ref 21–32)
CREAT SERPL-MCNC: 0.67 MG/DL (ref 0.6–1.3)
GFR SERPL CREATININE-BSD FRML MDRD: 95 ML/MIN/1.73SQ M
GLUCOSE P FAST SERPL-MCNC: 123 MG/DL (ref 65–99)
LDLC SERPL DIRECT ASSAY-MCNC: 55 MG/DL (ref 0–100)
POTASSIUM SERPL-SCNC: 3.9 MMOL/L (ref 3.5–5.3)
SODIUM SERPL-SCNC: 129 MMOL/L (ref 135–147)

## 2023-05-10 ENCOUNTER — APPOINTMENT (OUTPATIENT)
Dept: LAB | Facility: CLINIC | Age: 63
End: 2023-05-10
Payer: COMMERCIAL

## 2023-05-10 DIAGNOSIS — I10 HYPERTENSION, UNSPECIFIED TYPE: ICD-10-CM

## 2023-05-10 DIAGNOSIS — R73.9 HYPERGLYCEMIA: ICD-10-CM

## 2023-05-10 DIAGNOSIS — E03.9 HYPOTHYROIDISM, UNSPECIFIED TYPE: ICD-10-CM

## 2023-05-10 DIAGNOSIS — E78.2 MIXED HYPERLIPIDEMIA: ICD-10-CM

## 2023-05-10 LAB
ALBUMIN SERPL BCP-MCNC: 3.7 G/DL (ref 3.5–5)
ALP SERPL-CCNC: 80 U/L (ref 46–116)
ALT SERPL W P-5'-P-CCNC: 23 U/L (ref 12–78)
ANION GAP SERPL CALCULATED.3IONS-SCNC: 2 MMOL/L (ref 4–13)
AST SERPL W P-5'-P-CCNC: 23 U/L (ref 5–45)
BILIRUB DIRECT SERPL-MCNC: 0.09 MG/DL (ref 0–0.2)
BILIRUB SERPL-MCNC: 0.32 MG/DL (ref 0.2–1)
BUN SERPL-MCNC: 4 MG/DL (ref 5–25)
CALCIUM SERPL-MCNC: 9.1 MG/DL (ref 8.3–10.1)
CHLORIDE SERPL-SCNC: 106 MMOL/L (ref 96–108)
CHOLEST SERPL-MCNC: 158 MG/DL
CO2 SERPL-SCNC: 28 MMOL/L (ref 21–32)
CREAT SERPL-MCNC: 0.6 MG/DL (ref 0.6–1.3)
ERYTHROCYTE [DISTWIDTH] IN BLOOD BY AUTOMATED COUNT: 14.3 % (ref 11.6–15.1)
EST. AVERAGE GLUCOSE BLD GHB EST-MCNC: 114 MG/DL
GFR SERPL CREATININE-BSD FRML MDRD: 98 ML/MIN/1.73SQ M
GLUCOSE P FAST SERPL-MCNC: 113 MG/DL (ref 65–99)
HBA1C MFR BLD: 5.6 %
HCT VFR BLD AUTO: 40.2 % (ref 34.8–46.1)
HDLC SERPL-MCNC: 77 MG/DL
HGB BLD-MCNC: 13.4 G/DL (ref 11.5–15.4)
LDLC SERPL CALC-MCNC: 69 MG/DL (ref 0–100)
MCH RBC QN AUTO: 31 PG (ref 26.8–34.3)
MCHC RBC AUTO-ENTMCNC: 33.3 G/DL (ref 31.4–37.4)
MCV RBC AUTO: 93 FL (ref 82–98)
NONHDLC SERPL-MCNC: 81 MG/DL
PLATELET # BLD AUTO: 245 THOUSANDS/UL (ref 149–390)
PMV BLD AUTO: 11.4 FL (ref 8.9–12.7)
POTASSIUM SERPL-SCNC: 3.8 MMOL/L (ref 3.5–5.3)
PROT SERPL-MCNC: 7.4 G/DL (ref 6.4–8.4)
RBC # BLD AUTO: 4.32 MILLION/UL (ref 3.81–5.12)
SODIUM SERPL-SCNC: 136 MMOL/L (ref 135–147)
T4 SERPL-MCNC: 9.6 UG/DL (ref 4.7–13.3)
TRIGL SERPL-MCNC: 61 MG/DL
TSH SERPL DL<=0.05 MIU/L-ACNC: 1.12 UIU/ML (ref 0.45–4.5)
WBC # BLD AUTO: 9.73 THOUSAND/UL (ref 4.31–10.16)

## 2023-05-10 PROCEDURE — 36415 COLL VENOUS BLD VENIPUNCTURE: CPT

## 2023-05-10 PROCEDURE — 84436 ASSAY OF TOTAL THYROXINE: CPT

## 2023-05-10 PROCEDURE — 80048 BASIC METABOLIC PNL TOTAL CA: CPT

## 2023-05-10 PROCEDURE — 80076 HEPATIC FUNCTION PANEL: CPT

## 2023-05-10 PROCEDURE — 83036 HEMOGLOBIN GLYCOSYLATED A1C: CPT

## 2023-05-10 PROCEDURE — 84443 ASSAY THYROID STIM HORMONE: CPT

## 2023-05-10 PROCEDURE — 85027 COMPLETE CBC AUTOMATED: CPT

## 2023-05-10 PROCEDURE — 80061 LIPID PANEL: CPT

## 2023-06-23 ENCOUNTER — OFFICE VISIT (OUTPATIENT)
Dept: SURGERY | Facility: CLINIC | Age: 63
End: 2023-06-23
Payer: COMMERCIAL

## 2023-06-23 VITALS
BODY MASS INDEX: 21.8 KG/M2 | DIASTOLIC BLOOD PRESSURE: 70 MMHG | TEMPERATURE: 98 F | RESPIRATION RATE: 18 BRPM | OXYGEN SATURATION: 96 % | SYSTOLIC BLOOD PRESSURE: 116 MMHG | WEIGHT: 127 LBS | HEART RATE: 71 BPM

## 2023-06-23 DIAGNOSIS — K52.9 CHRONIC DIARRHEA OF UNKNOWN ORIGIN: Primary | ICD-10-CM

## 2023-06-23 PROCEDURE — 99203 OFFICE O/P NEW LOW 30 MIN: CPT | Performed by: SURGERY

## 2023-06-23 RX ORDER — LATANOPROST 50 UG/ML
SOLUTION/ DROPS OPHTHALMIC
COMMUNITY
Start: 2023-06-12

## 2023-06-23 NOTE — PROGRESS NOTES
Assessment/Plan:    Chronic diarrhea of unknown origin  Patient is a pleasant 78-year-old female with past medical history significant for tobacco and alcohol abuse presenting with a 2-year history of chronic intractable diarrhea of uncertain etiology for general surgery consultation  Patient was last treated at our practice 7 years ago at which time she underwent colonoscopy that was a normal study  Patient did quit drinking her 12 pack of beer a day 4 months ago  Patient reports daily watery bowel movements  She self medicates with Imodium  On physical examination she is well-appearing female  Pleasant competent and reliable as a historian  She has benign abdominal examination  Patient is a pleasant 78-year-old female presenting with chronic diarrhea of uncertain etiology  I have advised consultation with gastroenterology to direct her work-up  Referral has been entered into the computer record  She has been encouraged to follow-up to practice at any point the future with questions or concerns  Subjective:      Patient ID: Eli Hawkins is a 59 y o  female  Pt is coming in the office for Diarrhea, abd pain, trouble eating, nausea/vomiting/ Pre op for colonoscopy last colono 11/06/2016  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          Review of Systems   Constitutional: Negative for chills and fever  HENT: Negative for ear pain and sore throat  Eyes: Negative for pain and visual disturbance  Respiratory: Negative for cough and shortness of breath  Cardiovascular: Negative for chest pain and palpitations  Gastrointestinal: Positive for diarrhea  Negative for abdominal pain and vomiting  Genitourinary: Negative for dysuria and hematuria  Musculoskeletal: Negative for arthralgias and back pain  Skin: Negative for color change and rash  Neurological: Negative for seizures and syncope  All other systems reviewed and are negative          Objective:      /70 (BP Location: Right arm, Patient Position: Sitting, Cuff Size: Standard)   Pulse 71   Temp 98 °F (36 7 °C) (Temporal)   Resp 18   Wt 57 6 kg (127 lb)   SpO2 96%   BMI 21 80 kg/m²          Physical Exam  Constitutional:       Appearance: She is well-developed  HENT:      Head: Normocephalic and atraumatic  Eyes:      Conjunctiva/sclera: Conjunctivae normal       Pupils: Pupils are equal, round, and reactive to light  Cardiovascular:      Rate and Rhythm: Normal rate and regular rhythm  Pulmonary:      Effort: Pulmonary effort is normal       Breath sounds: Normal breath sounds  Abdominal:      General: Bowel sounds are normal       Palpations: Abdomen is soft  Comments: Benign abdominal examination  Abdomen flat nontender  Musculoskeletal:         General: Normal range of motion  Cervical back: Normal range of motion and neck supple  Skin:     General: Skin is warm and dry  Neurological:      Mental Status: She is alert and oriented to person, place, and time  Psychiatric:         Behavior: Behavior normal          Thought Content:  Thought content normal          Judgment: Judgment normal

## 2023-09-01 ENCOUNTER — APPOINTMENT (OUTPATIENT)
Dept: LAB | Facility: CLINIC | Age: 63
End: 2023-09-01
Payer: COMMERCIAL

## 2023-09-01 DIAGNOSIS — I10 HYPERTENSION, UNSPECIFIED TYPE: ICD-10-CM

## 2023-09-01 DIAGNOSIS — D50.9 IRON DEFICIENCY ANEMIA, UNSPECIFIED IRON DEFICIENCY ANEMIA TYPE: ICD-10-CM

## 2023-09-01 PROCEDURE — 85027 COMPLETE CBC AUTOMATED: CPT

## 2023-09-01 PROCEDURE — 36415 COLL VENOUS BLD VENIPUNCTURE: CPT

## 2023-09-01 PROCEDURE — 80048 BASIC METABOLIC PNL TOTAL CA: CPT

## 2023-09-02 LAB
ANION GAP SERPL CALCULATED.3IONS-SCNC: 8 MMOL/L
BUN SERPL-MCNC: 5 MG/DL (ref 5–25)
CALCIUM SERPL-MCNC: 8.8 MG/DL (ref 8.4–10.2)
CHLORIDE SERPL-SCNC: 98 MMOL/L (ref 96–108)
CO2 SERPL-SCNC: 27 MMOL/L (ref 21–32)
CREAT SERPL-MCNC: 0.49 MG/DL (ref 0.6–1.3)
ERYTHROCYTE [DISTWIDTH] IN BLOOD BY AUTOMATED COUNT: 15.3 % (ref 11.6–15.1)
GFR SERPL CREATININE-BSD FRML MDRD: 104 ML/MIN/1.73SQ M
GLUCOSE SERPL-MCNC: 90 MG/DL (ref 65–140)
HCT VFR BLD AUTO: 33.3 % (ref 34.8–46.1)
HGB BLD-MCNC: 10 G/DL (ref 11.5–15.4)
MCH RBC QN AUTO: 29.3 PG (ref 26.8–34.3)
MCHC RBC AUTO-ENTMCNC: 30 G/DL (ref 31.4–37.4)
MCV RBC AUTO: 98 FL (ref 82–98)
PLATELET # BLD AUTO: 316 THOUSANDS/UL (ref 149–390)
PMV BLD AUTO: 10.3 FL (ref 8.9–12.7)
POTASSIUM SERPL-SCNC: 3.9 MMOL/L (ref 3.5–5.3)
RBC # BLD AUTO: 3.41 MILLION/UL (ref 3.81–5.12)
SODIUM SERPL-SCNC: 133 MMOL/L (ref 135–147)
WBC # BLD AUTO: 7.17 THOUSAND/UL (ref 4.31–10.16)

## (undated) DEVICE — 4-PORT MANIFOLD: Brand: NEPTUNE 2

## (undated) DEVICE — ASTOUND IMPERVIOUS SURGICAL GOWN: Brand: CONVERTORS

## (undated) DEVICE — SMARTGOWN SURGICAL GOWN, XL, LONG: Brand: CONVERTORS

## (undated) DEVICE — TOWEL SURG XR DETECT GREEN STRL RFD

## (undated) DEVICE — GLOVE SRG BIOGEL 8

## (undated) DEVICE — SPLINT COMFORT 4 X 30

## (undated) DEVICE — INTENDED FOR TISSUE SEPARATION, AND OTHER PROCEDURES THAT REQUIRE A SHARP SURGICAL BLADE TO PUNCTURE OR CUT.: Brand: BARD-PARKER ® CARBON RIB-BACK BLADES

## (undated) DEVICE — URETERAL CATHETER ADAPTOR TIP

## (undated) DEVICE — PADDING CAST 4 IN  COTTON STRL

## (undated) DEVICE — BAG DECANTER

## (undated) DEVICE — NEEDLE 21 G X 1 1/2 SAFETY

## (undated) DEVICE — STERILE BETHLEHEM PLASTIC HAND: Brand: CARDINAL HEALTH

## (undated) DEVICE — GLOVE SRG BIOGEL 7.5

## (undated) DEVICE — ARM SLING: Brand: DEROYAL

## (undated) DEVICE — NEEDLE 18 G X 1 1/2 SAFETY

## (undated) DEVICE — VESSEL LOOP MAXI BLUE

## (undated) DEVICE — NEEDLE 25G X 1 1/2

## (undated) DEVICE — CUFF TOURNIQUET 24 X 4 IN QUICK CONNECT DISP 1BLA

## (undated) DEVICE — GAUZE SPONGES,16 PLY: Brand: CURITY

## (undated) DEVICE — PLUMEPEN PRO 10FT

## (undated) DEVICE — PREP SURGICAL PURPREP 26ML

## (undated) DEVICE — SYRINGE 20ML LL

## (undated) DEVICE — DRESSING XEROFORM 5 X 9

## (undated) DEVICE — 3M™ COBAN™ NL STERILE NON-LATEX SELF-ADHERENT WRAP, 2084S, 4 IN X 5 YD (10 CM X 4,5 M), 18 ROLLS/CASE: Brand: 3M™ COBAN™

## (undated) DEVICE — SUT ETHILON 3-0 INFS-1 30 IN 669H

## (undated) DEVICE — GLOVE INDICATOR UNDERGLOVE SZ 7.5 GREEN

## (undated) DEVICE — TUBING SUCTION 5MM X 12 FT

## (undated) DEVICE — STRL PENROSE DRAIN 18" X 1/4": Brand: CARDINAL HEALTH

## (undated) DEVICE — GLOVE INDICATOR UNDERGLOVE SZ 8 GREEN

## (undated) DEVICE — SYRINGE 10ML LL

## (undated) DEVICE — ACE WRAP 6 IN UNSTERILE

## (undated) DEVICE — PAD CAST 6 IN COTTON NON STERILE

## (undated) DEVICE — ACE WRAP 4 IN STERILE

## (undated) DEVICE — SUT VICRYL 2-0 CP-1 27 IN J266H

## (undated) DEVICE — STOCKINETTE,IMPERVIOUS,12X48,STERILE: Brand: MEDLINE

## (undated) DEVICE — SUT VICRYL 1 CP 27 IN J196H

## (undated) DEVICE — READY WET SKIN SCRUB TRAY-LF: Brand: MEDLINE INDUSTRIES, INC.

## (undated) DEVICE — FRAZIER SUCTION INSTRUMENT 18 FR W/OBTURATOR, NO CONTROL VENT: Brand: FRAZIER